# Patient Record
Sex: FEMALE | Employment: UNEMPLOYED
[De-identification: names, ages, dates, MRNs, and addresses within clinical notes are randomized per-mention and may not be internally consistent; named-entity substitution may affect disease eponyms.]

---

## 2017-06-29 ENCOUNTER — DIAGNOSTIC TRANS (OUTPATIENT)
Dept: OTHER | Age: 52
End: 2017-06-29

## 2017-06-29 ENCOUNTER — HOSPITAL (OUTPATIENT)
Dept: OTHER | Age: 52
End: 2017-06-29
Attending: INTERNAL MEDICINE

## 2017-06-29 LAB
ANALYZER ANC (IANC): ABNORMAL
ANION GAP SERPL CALC-SCNC: 10 MMOL/L (ref 10–20)
BASOPHILS # BLD: 0 THOUSAND/MCL (ref 0–0.3)
BASOPHILS NFR BLD: 1 %
BUN SERPL-MCNC: 13 MG/DL (ref 6–20)
BUN/CREAT SERPL: 21 (ref 7–25)
CALCIUM SERPL-MCNC: 8.6 MG/DL (ref 8.4–10.2)
CHLORIDE: 107 MMOL/L (ref 98–107)
CO2 SERPL-SCNC: 29 MMOL/L (ref 21–32)
CREAT SERPL-MCNC: 0.63 MG/DL (ref 0.51–0.95)
DIFFERENTIAL METHOD BLD: ABNORMAL
EOSINOPHIL # BLD: 0.1 THOUSAND/MCL (ref 0.1–0.5)
EOSINOPHIL NFR BLD: 2 %
ERYTHROCYTE [DISTWIDTH] IN BLOOD: 14.9 % (ref 11–15)
GLUCOSE SERPL-MCNC: 122 MG/DL (ref 65–99)
HEMATOCRIT: 35.4 % (ref 36–46.5)
HGB BLD-MCNC: 11.1 GM/DL (ref 12–15.5)
INR PPP: 0.9
LYMPHOCYTES # BLD: 2 THOUSAND/MCL (ref 1–4)
LYMPHOCYTES NFR BLD: 37 %
MCH RBC QN AUTO: 22.6 PG (ref 26–34)
MCHC RBC AUTO-ENTMCNC: 31.4 GM/DL (ref 32–36.5)
MCV RBC AUTO: 72.1 FL (ref 78–100)
MONOCYTES # BLD: 0.4 THOUSAND/MCL (ref 0.3–0.9)
MONOCYTES NFR BLD: 8 %
NEUTROPHILS # BLD: 2.8 THOUSAND/MCL (ref 1.8–7.7)
NEUTROPHILS NFR BLD: 52 %
NEUTS SEG NFR BLD: ABNORMAL %
PERCENT NRBC: ABNORMAL
PLATELET # BLD: 280 THOUSAND/MCL (ref 140–450)
POTASSIUM SERPL-SCNC: 3.9 MMOL/L (ref 3.4–5.1)
PROTHROMBIN TIME: 10 SECONDS (ref 9.7–11.8)
PROTHROMBIN TIME: NORMAL
RBC # BLD: 4.91 MILLION/MCL (ref 4–5.2)
SODIUM SERPL-SCNC: 142 MMOL/L (ref 135–145)
TROPONIN I SERPL HS-MCNC: <0.02 NG/ML
TROPONIN I SERPL HS-MCNC: <0.02 NG/ML
WBC # BLD: 5.4 THOUSAND/MCL (ref 4.2–11)

## 2017-06-30 LAB
ALBUMIN SERPL-MCNC: 2.3 GM/DL (ref 3.6–5.1)
ALBUMIN/GLOB SERPL: 0.7 {RATIO} (ref 1–2.4)
ALP SERPL-CCNC: 83 UNIT/L (ref 45–117)
ALT SERPL-CCNC: 18 UNIT/L
ANALYZER ANC (IANC): ABNORMAL
ANION GAP SERPL CALC-SCNC: 12 MMOL/L (ref 10–20)
AST SERPL-CCNC: 17 UNIT/L
BASOPHILS # BLD: 0 THOUSAND/MCL (ref 0–0.3)
BASOPHILS NFR BLD: 0 %
BILIRUB SERPL-MCNC: 0.3 MG/DL (ref 0.2–1)
BUN SERPL-MCNC: 13 MG/DL (ref 6–20)
BUN/CREAT SERPL: 21 (ref 7–25)
CALCIUM SERPL-MCNC: 8.4 MG/DL (ref 8.4–10.2)
CHLORIDE: 107 MMOL/L (ref 98–107)
CO2 SERPL-SCNC: 27 MMOL/L (ref 21–32)
CREAT SERPL-MCNC: 0.62 MG/DL (ref 0.51–0.95)
DIFFERENTIAL METHOD BLD: ABNORMAL
EOSINOPHIL # BLD: 0.2 THOUSAND/MCL (ref 0.1–0.5)
EOSINOPHIL NFR BLD: 4 %
ERYTHROCYTE [DISTWIDTH] IN BLOOD: 14.9 % (ref 11–15)
GLOBULIN SER-MCNC: 3.5 GM/DL (ref 2–4)
GLUCOSE SERPL-MCNC: 109 MG/DL (ref 65–99)
HEMATOCRIT: 34.8 % (ref 36–46.5)
HGB BLD-MCNC: 10.8 GM/DL (ref 12–15.5)
LYMPHOCYTES # BLD: 2.1 THOUSAND/MCL (ref 1–4)
LYMPHOCYTES NFR BLD: 41 %
MCH RBC QN AUTO: 22.5 PG (ref 26–34)
MCHC RBC AUTO-ENTMCNC: 31 GM/DL (ref 32–36.5)
MCV RBC AUTO: 72.3 FL (ref 78–100)
MONOCYTES # BLD: 0.5 THOUSAND/MCL (ref 0.3–0.9)
MONOCYTES NFR BLD: 9 %
NEUTROPHILS # BLD: 2.3 THOUSAND/MCL (ref 1.8–7.7)
NEUTROPHILS NFR BLD: 46 %
NEUTS SEG NFR BLD: ABNORMAL %
PERCENT NRBC: ABNORMAL
PLATELET # BLD: 266 THOUSAND/MCL (ref 140–450)
POTASSIUM SERPL-SCNC: 4 MMOL/L (ref 3.4–5.1)
PROT SERPL-MCNC: 5.8 GM/DL (ref 6.4–8.2)
RBC # BLD: 4.81 MILLION/MCL (ref 4–5.2)
SODIUM SERPL-SCNC: 142 MMOL/L (ref 135–145)
WBC # BLD: 5.1 THOUSAND/MCL (ref 4.2–11)

## 2017-10-30 ENCOUNTER — DIAGNOSTIC TRANS (OUTPATIENT)
Dept: OTHER | Age: 52
End: 2017-10-30

## 2017-10-30 ENCOUNTER — HOSPITAL (OUTPATIENT)
Dept: OTHER | Age: 52
End: 2017-10-30
Attending: EMERGENCY MEDICINE

## 2017-10-30 LAB
ANALYZER ANC (IANC): ABNORMAL
ANION GAP SERPL CALC-SCNC: 12 MMOL/L (ref 10–20)
BASOPHILS # BLD: 0 THOUSAND/MCL (ref 0–0.3)
BASOPHILS NFR BLD: 0 %
BUN SERPL-MCNC: 14 MG/DL (ref 6–20)
BUN/CREAT SERPL: 20 (ref 7–25)
CALCIUM SERPL-MCNC: 8.9 MG/DL (ref 8.4–10.2)
CHLORIDE: 105 MMOL/L (ref 98–107)
CO2 SERPL-SCNC: 28 MMOL/L (ref 21–32)
CREAT SERPL-MCNC: 0.71 MG/DL (ref 0.51–0.95)
D DIMER PPP FEU-MCNC: 0.37 MG/L FEU
DIFFERENTIAL METHOD BLD: ABNORMAL
EOSINOPHIL # BLD: 0.2 THOUSAND/MCL (ref 0.1–0.5)
EOSINOPHIL NFR BLD: 3 %
ERYTHROCYTE [DISTWIDTH] IN BLOOD: 15.2 % (ref 11–15)
GLUCOSE SERPL-MCNC: 119 MG/DL (ref 65–99)
HEMATOCRIT: 37.2 % (ref 36–46.5)
HGB BLD-MCNC: 11.7 GM/DL (ref 12–15.5)
LYMPHOCYTES # BLD: 2.7 THOUSAND/MCL (ref 1–4)
LYMPHOCYTES NFR BLD: 39 %
MCH RBC QN AUTO: 22.5 PG (ref 26–34)
MCHC RBC AUTO-ENTMCNC: 31.5 GM/DL (ref 32–36.5)
MCV RBC AUTO: 71.7 FL (ref 78–100)
MONOCYTES # BLD: 0.5 THOUSAND/MCL (ref 0.3–0.9)
MONOCYTES NFR BLD: 8 %
NEUTROPHILS # BLD: 3.4 THOUSAND/MCL (ref 1.8–7.7)
NEUTROPHILS NFR BLD: 50 %
NEUTS SEG NFR BLD: ABNORMAL %
PERCENT NRBC: ABNORMAL
PLATELET # BLD: 310 THOUSAND/MCL (ref 140–450)
POTASSIUM SERPL-SCNC: 3.8 MMOL/L (ref 3.4–5.1)
RBC # BLD: 5.19 MILLION/MCL (ref 4–5.2)
SODIUM SERPL-SCNC: 141 MMOL/L (ref 135–145)
WBC # BLD: 6.8 THOUSAND/MCL (ref 4.2–11)

## 2019-01-11 ENCOUNTER — DIAGNOSTIC TRANS (OUTPATIENT)
Dept: OTHER | Age: 54
End: 2019-01-11

## 2019-01-11 LAB
2009 H1N1 SUBTYPE (RF1N1): NOT DETECTED
ADENOVIRUS (RADENO): NOT DETECTED
AMORPH SED URNS QL MICRO: ABNORMAL
ANALYZER ANC (IANC): ABNORMAL
ANION GAP SERPL CALC-SCNC: 9 MMOL/L (ref 10–20)
APPEARANCE UR: CLEAR
BACTERIA #/AREA URNS HPF: ABNORMAL /HPF
BASOPHILS # BLD: 0 THOUSAND/MCL (ref 0–0.3)
BASOPHILS NFR BLD: 0 %
BILIRUB UR QL: NEGATIVE
BOCAVIRUS (RBOCA): NOT DETECTED
BUN SERPL-MCNC: 16 MG/DL (ref 6–20)
BUN/CREAT SERPL: 20 (ref 7–25)
C. PNEUMONIAE (RCHLP): NOT DETECTED
CALCIUM SERPL-MCNC: 8.6 MG/DL (ref 8.4–10.2)
CAOX CRY URNS QL MICRO: ABNORMAL
CHLORIDE: 108 MMOL/L (ref 98–107)
CO2 SERPL-SCNC: 25 MMOL/L (ref 21–32)
COLOR UR: YELLOW
CORONAVIRUS 229E (RC229E): NOT DETECTED
CORONAVIRUS HKU1 (RCHKU1): NOT DETECTED
CORONAVIRUS NL63 (RCNL63): NOT DETECTED
CORONAVIRUS OC43 (RCO43): NOT DETECTED
CREAT SERPL-MCNC: 0.8 MG/DL (ref 0.51–0.95)
DIFFERENTIAL METHOD BLD: ABNORMAL
EOSINOPHIL # BLD: 0.1 THOUSAND/MCL (ref 0.1–0.5)
EOSINOPHIL NFR BLD: 2 %
EPITH CASTS #/AREA URNS LPF: ABNORMAL /[LPF]
ERYTHROCYTE [DISTWIDTH] IN BLOOD: 14.7 % (ref 11–15)
FATTY CASTS #/AREA URNS LPF: ABNORMAL /[LPF]
GLUCOSE SERPL-MCNC: 161 MG/DL (ref 65–99)
GLUCOSE UR-MCNC: NEGATIVE MG/DL
GRAN CASTS #/AREA URNS LPF: ABNORMAL /[LPF]
HEMATOCRIT: 38.2 % (ref 36–46.5)
HGB BLD-MCNC: 11.4 GM/DL (ref 12–15.5)
HGB UR QL: ABNORMAL
HYALINE CASTS #/AREA URNS LPF: ABNORMAL /LPF (ref 0–5)
IMM GRANULOCYTES # BLD AUTO: 0.1 THOUSAND/MCL (ref 0–0.2)
IMM GRANULOCYTES NFR BLD: 1 %
INFLUENZA A SUBTYPE H1 (RFLH1): NOT DETECTED
INFLUENZA A SUBTYPE H3 (RFLH3): NOT DETECTED
INFLUENZA A UNSUBTYPABLE (RIAU): ABNORMAL
INFLUENZA B VIRUS (XFLUB): NOT DETECTED
KETONES UR-MCNC: NEGATIVE MG/DL
LACTATE BLDV-SCNC: 1.9 MMOL/L (ref 0–2)
LEUKOCYTE ESTERASE UR QL STRIP: NEGATIVE
LYMPHOCYTES # BLD: 1.2 THOUSAND/MCL (ref 1–4)
LYMPHOCYTES NFR BLD: 16 %
M. PNEUMONIAE (RMYPP): NOT DETECTED
MCH RBC QN AUTO: 22.2 PG (ref 26–34)
MCHC RBC AUTO-ENTMCNC: 29.8 GM/DL (ref 32–36.5)
MCV RBC AUTO: 74.3 FL (ref 78–100)
METAPNEUMOVIRUS (RMETA): NOT DETECTED
MICROSCOPIC (MT): ABNORMAL
MIXED CELL CASTS #/AREA URNS LPF: ABNORMAL /[LPF]
MONOCYTES # BLD: 0.5 THOUSAND/MCL (ref 0.3–0.9)
MONOCYTES NFR BLD: 7 %
MUCOUS THREADS URNS QL MICRO: PRESENT
NEUTROPHILS # BLD: 5.7 THOUSAND/MCL (ref 1.8–7.7)
NEUTROPHILS NFR BLD: 74 %
NEUTS SEG NFR BLD: ABNORMAL %
NITRITE UR QL: NEGATIVE
NRBC (NRBCRE): 0 /100 WBC
PARAINFLUENZA, TYPE 1 (RPAR1): NOT DETECTED
PARAINFLUENZA, TYPE 2 (RPAR2): NOT DETECTED
PARAINFLUENZA, TYPE 3 (RPAR3): NOT DETECTED
PARAINFLUENZA, TYPE 4 (RPAR4): NOT DETECTED
PH UR: 5 UNIT (ref 5–7)
PLATELET # BLD: 297 THOUSAND/MCL (ref 140–450)
POTASSIUM SERPL-SCNC: 3.8 MMOL/L (ref 3.4–5.1)
PROT UR QL: 100 MG/DL
RBC # BLD: 5.14 MILLION/MCL (ref 4–5.2)
RBC #/AREA URNS HPF: ABNORMAL /HPF (ref 0–3)
RBC CASTS #/AREA URNS LPF: ABNORMAL /[LPF]
RENAL EPI CELLS #/AREA URNS HPF: ABNORMAL /[HPF]
RHINOVIRUS/ENTEROVIRUS (RRHINO): NOT DETECTED
RSV, SUBTYPE A (RRSVA): POSITIVE
RSV, SUBTYPE B (RRSVB): NOT DETECTED
SODIUM SERPL-SCNC: 138 MMOL/L (ref 135–145)
SP GR UR: 1.01 (ref 1–1.03)
SPECIMEN SOURCE: ABNORMAL
SPECIMEN SOURCE: ABNORMAL
SPERM URNS QL MICRO: ABNORMAL
SQUAMOUS #/AREA URNS HPF: ABNORMAL /HPF (ref 0–5)
T VAGINALIS URNS QL MICRO: ABNORMAL
TRI-PHOS CRY URNS QL MICRO: ABNORMAL
TROPONIN I SERPL HS-MCNC: <0.02 NG/ML
URATE CRY URNS QL MICRO: ABNORMAL
URNS CMNT MICRO: ABNORMAL
UROBILINOGEN UR QL: 0.2 MG/DL (ref 0–1)
WAXY CASTS #/AREA URNS LPF: ABNORMAL /[LPF]
WBC # BLD: 7.7 THOUSAND/MCL (ref 4.2–11)
WBC #/AREA URNS HPF: ABNORMAL /HPF (ref 0–5)
WBC CASTS #/AREA URNS LPF: ABNORMAL /[LPF]
YEAST HYPHAE URNS QL MICRO: ABNORMAL
YEAST URNS QL MICRO: ABNORMAL

## 2019-01-12 ENCOUNTER — HOSPITAL (OUTPATIENT)
Dept: OTHER | Age: 54
End: 2019-01-12
Attending: INTERNAL MEDICINE

## 2019-01-12 ENCOUNTER — HOSPITAL (OUTPATIENT)
Dept: OTHER | Age: 54
End: 2019-01-12

## 2019-01-12 LAB
D DIMER PPP FEU-MCNC: 0.54 MG/L FEU
PROCALCITONIN SERPL IA-MCNC: <0.05 NG/ML

## 2019-01-13 LAB
ANALYZER ANC (IANC): ABNORMAL
ANION GAP SERPL CALC-SCNC: 12 MMOL/L (ref 10–20)
BASOPHILS # BLD: 0 THOUSAND/MCL (ref 0–0.3)
BASOPHILS NFR BLD: 0 %
BUN SERPL-MCNC: 20 MG/DL (ref 6–20)
BUN/CREAT SERPL: 23 (ref 7–25)
CALCIUM SERPL-MCNC: 8.8 MG/DL (ref 8.4–10.2)
CHLORIDE: 111 MMOL/L (ref 98–107)
CO2 SERPL-SCNC: 26 MMOL/L (ref 21–32)
CREAT SERPL-MCNC: 0.86 MG/DL (ref 0.51–0.95)
DIFFERENTIAL METHOD BLD: ABNORMAL
EOSINOPHIL # BLD: 0 THOUSAND/MCL (ref 0.1–0.5)
EOSINOPHIL NFR BLD: 1 %
ERYTHROCYTE [DISTWIDTH] IN BLOOD: 15.2 % (ref 11–15)
GLUCOSE SERPL-MCNC: 135 MG/DL (ref 65–99)
HEMATOCRIT: 36.4 % (ref 36–46.5)
HGB BLD-MCNC: 10.7 GM/DL (ref 12–15.5)
IMM GRANULOCYTES # BLD AUTO: 0.2 THOUSAND/MCL (ref 0–0.2)
IMM GRANULOCYTES NFR BLD: 3 %
LYMPHOCYTES # BLD: 1.5 THOUSAND/MCL (ref 1–4)
LYMPHOCYTES NFR BLD: 23 %
MCH RBC QN AUTO: 22 PG (ref 26–34)
MCHC RBC AUTO-ENTMCNC: 29.4 GM/DL (ref 32–36.5)
MCV RBC AUTO: 74.9 FL (ref 78–100)
MONOCYTES # BLD: 0.8 THOUSAND/MCL (ref 0.3–0.9)
MONOCYTES NFR BLD: 11 %
NEUTROPHILS # BLD: 4.2 THOUSAND/MCL (ref 1.8–7.7)
NEUTROPHILS NFR BLD: 62 %
NEUTS SEG NFR BLD: ABNORMAL %
NRBC (NRBCRE): 0 /100 WBC
PLATELET # BLD: 312 THOUSAND/MCL (ref 140–450)
POTASSIUM SERPL-SCNC: 4 MMOL/L (ref 3.4–5.1)
RBC # BLD: 4.86 MILLION/MCL (ref 4–5.2)
SODIUM SERPL-SCNC: 145 MMOL/L (ref 135–145)
WBC # BLD: 6.7 THOUSAND/MCL (ref 4.2–11)

## 2019-07-03 ENCOUNTER — HOSPITAL (OUTPATIENT)
Dept: OTHER | Age: 54
End: 2019-07-03
Attending: INTERNAL MEDICINE

## 2019-08-16 ENCOUNTER — APPOINTMENT (OUTPATIENT)
Dept: GENERAL RADIOLOGY | Facility: HOSPITAL | Age: 54
End: 2019-08-16
Attending: EMERGENCY MEDICINE
Payer: MEDICAID

## 2019-08-16 ENCOUNTER — HOSPITAL ENCOUNTER (EMERGENCY)
Facility: HOSPITAL | Age: 54
Discharge: HOME | End: 2019-08-16
Attending: EMERGENCY MEDICINE
Payer: MEDICAID

## 2019-08-16 VITALS
BODY MASS INDEX: 46.38 KG/M2 | HEIGHT: 62 IN | RESPIRATION RATE: 14 BRPM | WEIGHT: 252 LBS | DIASTOLIC BLOOD PRESSURE: 108 MMHG | OXYGEN SATURATION: 94 % | TEMPERATURE: 98 F | HEART RATE: 88 BPM | SYSTOLIC BLOOD PRESSURE: 142 MMHG

## 2019-08-16 DIAGNOSIS — S62.524A CLOSED NONDISPLACED FRACTURE OF DISTAL PHALANX OF RIGHT THUMB, INITIAL ENCOUNTER: Primary | ICD-10-CM

## 2019-08-16 DIAGNOSIS — S39.012A STRAIN OF LUMBAR REGION, INITIAL ENCOUNTER: ICD-10-CM
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
## 2019-08-16 DIAGNOSIS — S83.92XA SPRAIN OF LEFT KNEE, UNSPECIFIED LIGAMENT, INITIAL ENCOUNTER: ICD-10-CM

## 2019-08-16 PROCEDURE — 73140 X-RAY EXAM OF FINGER(S): CPT | Performed by: EMERGENCY MEDICINE

## 2019-08-16 PROCEDURE — 73562 X-RAY EXAM OF KNEE 3: CPT | Performed by: EMERGENCY MEDICINE

## 2019-08-16 PROCEDURE — 99284 EMERGENCY DEPT VISIT MOD MDM: CPT

## 2019-08-16 PROCEDURE — 72100 X-RAY EXAM L-S SPINE 2/3 VWS: CPT | Performed by: EMERGENCY MEDICINE

## 2019-08-16 RX ORDER — TRAMADOL HYDROCHLORIDE 50 MG/1
50 TABLET ORAL EVERY 6 HOURS PRN
Qty: 10 TABLET | Refills: 0 | Status: SHIPPED | OUTPATIENT
Start: 2019-08-16 | End: 2019-08-23

## 2019-08-16 RX ORDER — LISINOPRIL 10 MG/1
10 TABLET ORAL DAILY
COMMUNITY

## 2019-08-16 RX ORDER — ALLOPURINOL 100 MG/1
100 TABLET ORAL DAILY
COMMUNITY

## 2019-08-16 RX ORDER — TRAMADOL HYDROCHLORIDE 50 MG/1
50 TABLET ORAL ONCE
Status: COMPLETED | OUTPATIENT
Start: 2019-08-16 | End: 2019-08-16

## 2019-08-16 NOTE — ED NOTES
Pt provided with discharge instructions. Verbalized understanding for plan of care at home and follow up. All questions/concerns addressed prior to discharge.    Pt wheeled out of er with family member

## 2019-08-16 NOTE — ED NOTES
Pt c/o pain in multiple areas after she tripped up a stair tonight. Pt c/o pain to bl knees, rt 1st digit, and low back. Pt denies hitting her head, LOC, blood thinners. CMS is intact to ble. Swelling & bruising noted to rt 1st digit.  Will continue to [facility]

## 2019-08-16 NOTE — ED PROVIDER NOTES
Patient Seen in: [facility] Emergency Department    History   Patient presents with:  Trauma (cardiovascular, musculoskeletal)    Stated Complaint: missed a step and fell    HPI    History is provided by patient and patient's son.     67-year-old f negative. Positive for stated complaint: missed a step and fell  Other systems are as noted in HPI. Constitutional and vital signs reviewed. All other systems reviewed and negative except as noted above.     Physical Exam     ED Triage Vitals [08 normal sensation in all extremities, normal finger to nose b/l, normal gait, no facial asymmetry, normal speech     Skin: Skin is warm and dry. No rash noted. She is not diaphoretic. Psychiatric: She has a normal mood and affect.    Nursing note and vital and verified by the Radiologist whose name is printed above. DD:  08/16/2019/DT:  08/16/2019    EMERGENCY DEPARTMENT COURSE AND TREATMENT:  Patient's condition was stable during Emergency Department evaluation.      53yoF with multiple complaints after f region, initial encounter  Fall, initial encounter    Disposition:  Discharge  8/16/2019  3:27 am    Follow-up:  Vic Ornelas, [address]  [phone]    Schedule an appointment as soon as possible for a visit in [address]

## 2019-08-16 NOTE — ED INITIAL ASSESSMENT (HPI)
Mechanical fall at 5pm walking into house from garage, missed a step and fell on her left side. Denies hitting head, or LOC. +nausea and dizziness after fall. C/o bilateral knee pain, limited ROM d/t.  Also c/o right thumb pain-bruising and swelling present

## 2021-10-14 ENCOUNTER — HOSPITAL ENCOUNTER (EMERGENCY)
Age: 56
Discharge: HOME | End: 2021-10-14
Attending: EMERGENCY MEDICINE

## 2021-10-14 ENCOUNTER — APPOINTMENT (OUTPATIENT)
Dept: GENERAL RADIOLOGY | Age: 56
End: 2021-10-14
Attending: EMERGENCY MEDICINE

## 2021-10-14 VITALS
RESPIRATION RATE: 18 BRPM | OXYGEN SATURATION: 98 % | HEART RATE: 112 BPM | TEMPERATURE: 95.9 F | DIASTOLIC BLOOD PRESSURE: 116 MMHG | SYSTOLIC BLOOD PRESSURE: 147 MMHG

## 2021-10-14 DIAGNOSIS — J40 BRONCHITIS: ICD-10-CM

## 2021-10-14 DIAGNOSIS — B34.9 VIRAL SYNDROME: Primary | ICD-10-CM

## 2021-10-14 DIAGNOSIS — R06.02 SHORTNESS OF BREATH: ICD-10-CM

## 2021-10-14 LAB
ALBUMIN SERPL-MCNC: 2.6 G/DL (ref 3.6–5.1)
ALBUMIN/GLOB SERPL: 0.6 {RATIO} (ref 1–2.4)
ALP SERPL-CCNC: 114 UNITS/L (ref 45–117)
ALT SERPL-CCNC: 22 UNITS/L
ANION GAP SERPL CALC-SCNC: 9 MMOL/L (ref 10–20)
AST SERPL-CCNC: 20 UNITS/L
ATRIAL RATE (BPM): 104
BASOPHILS # BLD: 0 K/MCL (ref 0–0.3)
BASOPHILS NFR BLD: 1 %
BILIRUB SERPL-MCNC: 0.3 MG/DL (ref 0.2–1)
BUN SERPL-MCNC: 16 MG/DL (ref 6–20)
BUN/CREAT SERPL: 21 (ref 7–25)
CALCIUM SERPL-MCNC: 8.8 MG/DL (ref 8.4–10.2)
CHLORIDE SERPL-SCNC: 106 MMOL/L (ref 98–107)
CO2 SERPL-SCNC: 25 MMOL/L (ref 21–32)
CREAT SERPL-MCNC: 0.76 MG/DL (ref 0.51–0.95)
D DIMER PPP FEU-MCNC: 0.51 MG/L (FEU)
DEPRECATED RDW RBC: 42.8 FL (ref 39–50)
EOSINOPHIL # BLD: 0.3 K/MCL (ref 0–0.5)
EOSINOPHIL NFR BLD: 4 %
ERYTHROCYTE [DISTWIDTH] IN BLOOD: 16.2 % (ref 11–15)
FASTING DURATION TIME PATIENT: ABNORMAL H
GFR SERPLBLD BASED ON 1.73 SQ M-ARVRAT: 87 ML/MIN
GLOBULIN SER-MCNC: 4.6 G/DL (ref 2–4)
GLUCOSE SERPL-MCNC: 145 MG/DL (ref 70–99)
HCT VFR BLD CALC: 38.5 % (ref 36–46.5)
HGB BLD-MCNC: 11.5 G/DL (ref 12–15.5)
IMM GRANULOCYTES # BLD AUTO: 0.1 K/MCL (ref 0–0.2)
IMM GRANULOCYTES # BLD: 1 %
LYMPHOCYTES # BLD: 0.9 K/MCL (ref 1–4)
LYMPHOCYTES NFR BLD: 11 %
MCH RBC QN AUTO: 22 PG (ref 26–34)
MCHC RBC AUTO-ENTMCNC: 29.9 G/DL (ref 32–36.5)
MCV RBC AUTO: 73.8 FL (ref 78–100)
MONOCYTES # BLD: 0.8 K/MCL (ref 0.3–0.9)
MONOCYTES NFR BLD: 9 %
NEUTROPHILS # BLD: 6.2 K/MCL (ref 1.8–7.7)
NEUTROPHILS NFR BLD: 74 %
NRBC BLD MANUAL-RTO: 0 /100 WBC
P AXIS (DEGREES): 46
PLATELET # BLD AUTO: 286 K/MCL (ref 140–450)
POTASSIUM SERPL-SCNC: 4.3 MMOL/L (ref 3.4–5.1)
PR-INTERVAL (MSEC): 137
PROT SERPL-MCNC: 7.2 G/DL (ref 6.4–8.2)
QRS-INTERVAL (MSEC): 79
QT-INTERVAL (MSEC): 326
QTC: 431
R AXIS (DEGREES): -36
RBC # BLD: 5.22 MIL/MCL (ref 4–5.2)
REPORT TEXT: NORMAL
SARS-COV-2 RNA RESP QL NAA+PROBE: NOT DETECTED
SERVICE CMNT-IMP: NORMAL
SERVICE CMNT-IMP: NORMAL
SODIUM SERPL-SCNC: 136 MMOL/L (ref 135–145)
T AXIS (DEGREES): 29
TROPONIN I SERPL HS-MCNC: <0.02 NG/ML
VENTRICULAR RATE EKG/MIN (BPM): 105
WBC # BLD: 8.3 K/MCL (ref 4.2–11)

## 2021-10-14 PROCEDURE — 93010 ELECTROCARDIOGRAM REPORT: CPT | Performed by: INTERNAL MEDICINE

## 2021-10-14 PROCEDURE — 85379 FIBRIN DEGRADATION QUANT: CPT | Performed by: EMERGENCY MEDICINE

## 2021-10-14 PROCEDURE — 93005 ELECTROCARDIOGRAM TRACING: CPT | Performed by: EMERGENCY MEDICINE

## 2021-10-14 PROCEDURE — 99284 EMERGENCY DEPT VISIT MOD MDM: CPT | Performed by: EMERGENCY MEDICINE

## 2021-10-14 PROCEDURE — 36415 COLL VENOUS BLD VENIPUNCTURE: CPT

## 2021-10-14 PROCEDURE — 84484 ASSAY OF TROPONIN QUANT: CPT | Performed by: EMERGENCY MEDICINE

## 2021-10-14 PROCEDURE — 99285 EMERGENCY DEPT VISIT HI MDM: CPT

## 2021-10-14 PROCEDURE — C9803 HOPD COVID-19 SPEC COLLECT: HCPCS

## 2021-10-14 PROCEDURE — 87635 SARS-COV-2 COVID-19 AMP PRB: CPT | Performed by: EMERGENCY MEDICINE

## 2021-10-14 PROCEDURE — 71046 X-RAY EXAM CHEST 2 VIEWS: CPT

## 2021-10-14 PROCEDURE — 80053 COMPREHEN METABOLIC PANEL: CPT | Performed by: EMERGENCY MEDICINE

## 2021-10-14 PROCEDURE — 85025 COMPLETE CBC W/AUTO DIFF WBC: CPT | Performed by: EMERGENCY MEDICINE

## 2021-10-14 RX ORDER — DOXYCYCLINE HYCLATE 100 MG
100 TABLET ORAL 2 TIMES DAILY
Qty: 14 TABLET | Refills: 0 | Status: SHIPPED | OUTPATIENT
Start: 2021-10-14 | End: 2021-10-21

## 2021-10-14 RX ORDER — PREDNISONE 50 MG/1
50 TABLET ORAL DAILY
Qty: 5 TABLET | Refills: 0 | Status: SHIPPED | OUTPATIENT
Start: 2021-10-14

## 2021-10-14 ASSESSMENT — PAIN SCALES - GENERAL: PAINLEVEL_OUTOF10: 0

## 2021-10-30 ENCOUNTER — HOSPITAL ENCOUNTER (EMERGENCY)
Facility: HOSPITAL | Age: 56
Discharge: HOME | End: 2021-10-30
Attending: EMERGENCY MEDICINE
Payer: MEDICAID

## 2021-10-30 ENCOUNTER — APPOINTMENT (OUTPATIENT)
Dept: GENERAL RADIOLOGY | Facility: HOSPITAL | Age: 56
End: 2021-10-30
Attending: EMERGENCY MEDICINE
Payer: MEDICAID

## 2021-10-30 ENCOUNTER — APPOINTMENT (OUTPATIENT)
Dept: CT IMAGING | Facility: HOSPITAL | Age: 56
End: 2021-10-30
Attending: EMERGENCY MEDICINE
Payer: MEDICAID

## 2021-10-30 VITALS
DIASTOLIC BLOOD PRESSURE: 108 MMHG | HEIGHT: 62 IN | OXYGEN SATURATION: 98 % | TEMPERATURE: 99 F | SYSTOLIC BLOOD PRESSURE: 161 MMHG | HEART RATE: 102 BPM | BODY MASS INDEX: 49.69 KG/M2 | WEIGHT: 270 LBS | RESPIRATION RATE: 22 BRPM

## 2021-10-30 DIAGNOSIS — R19.7 NAUSEA VOMITING AND DIARRHEA: ICD-10-CM

## 2021-10-30 DIAGNOSIS — R11.2 NAUSEA VOMITING AND DIARRHEA: ICD-10-CM

## 2021-10-30 DIAGNOSIS — N30.00 ACUTE CYSTITIS WITHOUT HEMATURIA: Primary | ICD-10-CM

## 2021-10-30 PROCEDURE — 99285 EMERGENCY DEPT VISIT HI MDM: CPT

## 2021-10-30 PROCEDURE — 96372 THER/PROPH/DIAG INJ SC/IM: CPT

## 2021-10-30 PROCEDURE — 83690 ASSAY OF LIPASE: CPT | Performed by: EMERGENCY MEDICINE

## 2021-10-30 PROCEDURE — 82550 ASSAY OF CK (CPK): CPT | Performed by: EMERGENCY MEDICINE

## 2021-10-30 PROCEDURE — 80076 HEPATIC FUNCTION PANEL: CPT | Performed by: EMERGENCY MEDICINE

## 2021-10-30 PROCEDURE — 93010 ELECTROCARDIOGRAM REPORT: CPT | Performed by: EMERGENCY MEDICINE

## 2021-10-30 PROCEDURE — 85025 COMPLETE CBC W/AUTO DIFF WBC: CPT | Performed by: EMERGENCY MEDICINE

## 2021-10-30 PROCEDURE — 96360 HYDRATION IV INFUSION INIT: CPT

## 2021-10-30 PROCEDURE — 96361 HYDRATE IV INFUSION ADD-ON: CPT

## 2021-10-30 PROCEDURE — 93005 ELECTROCARDIOGRAM TRACING: CPT

## 2021-10-30 PROCEDURE — 74177 CT ABD & PELVIS W/CONTRAST: CPT | Performed by: EMERGENCY MEDICINE

## 2021-10-30 PROCEDURE — 71045 X-RAY EXAM CHEST 1 VIEW: CPT | Performed by: EMERGENCY MEDICINE

## 2021-10-30 PROCEDURE — 87086 URINE CULTURE/COLONY COUNT: CPT | Performed by: EMERGENCY MEDICINE

## 2021-10-30 PROCEDURE — 81001 URINALYSIS AUTO W/SCOPE: CPT | Performed by: EMERGENCY MEDICINE

## 2021-10-30 PROCEDURE — 80048 BASIC METABOLIC PNL TOTAL CA: CPT | Performed by: EMERGENCY MEDICINE

## 2021-10-30 RX ORDER — ONDANSETRON 4 MG/1
4 TABLET, ORALLY DISINTEGRATING ORAL EVERY 4 HOURS PRN
Qty: 10 TABLET | Refills: 0 | Status: SHIPPED | OUTPATIENT
Start: 2021-10-30 | End: 2021-11-06

## 2021-10-30 RX ORDER — DICYCLOMINE HCL 20 MG
20 TABLET ORAL 4 TIMES DAILY PRN
Qty: 30 TABLET | Refills: 0 | Status: SHIPPED | OUTPATIENT
Start: 2021-10-30 | End: 2021-11-29

## 2021-10-30 RX ORDER — SULFAMETHOXAZOLE AND TRIMETHOPRIM 800; 160 MG/1; MG/1
1 TABLET ORAL 2 TIMES DAILY
Qty: 14 TABLET | Refills: 0 | Status: SHIPPED | OUTPATIENT
Start: 2021-10-30 | End: 2021-11-06

## 2021-10-30 RX ORDER — CEPHALEXIN 250 MG/5ML
POWDER, FOR SUSPENSION ORAL 4 TIMES DAILY
COMMUNITY

## 2021-10-30 RX ORDER — DICYCLOMINE HYDROCHLORIDE 10 MG/ML
20 INJECTION INTRAMUSCULAR ONCE
Status: COMPLETED | OUTPATIENT
Start: 2021-10-30 | End: 2021-10-30

## 2021-10-30 NOTE — ED INITIAL ASSESSMENT (HPI)
States that she stated to Have Lt sided abd pain that started earlier this day. (+) N/V D.    Has been on medss for UTI

## 2021-10-30 NOTE — ED PROVIDER NOTES
Patient Seen in: [facility] AND [facility] Emergency Department      History   Patient presents with:  Abdomen/Flank Pain    Stated Complaint: Lt abd pain     Subjective:   HPI  23-year-old female with thin basement membrane disease, hypertension, presents for e Cardiovascular:      Rate and Rhythm: Normal rate and regular rhythm. Heart sounds: Normal heart sounds. Pulmonary:      Effort: Pulmonary effort is normal.      Breath sounds: Normal breath sounds.    Abdominal:      General: There is no distensio following components:    Albumin 2.5 (*)     All other components within normal limits   CBC W/ DIFFERENTIAL - Abnormal; Notable for the following components:    HGB 10.6 (*)     MCV 73.4 (*)     MCH 21.7 (*)     MCHC 29.6 (*)     RDW 16.0 (*)     All othe lipase. CPK within normal range. There is no leukocytosis. She does have evidence of urinary tract infection will be treated with antibiotics. Chest x-ray and CT and pelvis without clear etiology to explain her symptoms.   In the ER, she was treated wit

## 2021-11-24 ENCOUNTER — HOSPITAL ENCOUNTER (OUTPATIENT)
Dept: ULTRASOUND IMAGING | Age: 56
Discharge: HOME | End: 2021-11-24
Attending: INTERNAL MEDICINE

## 2021-11-24 ENCOUNTER — HOSPITAL ENCOUNTER (OUTPATIENT)
Dept: MAMMOGRAPHY | Age: 56
Discharge: HOME | End: 2021-11-24
Attending: INTERNAL MEDICINE

## 2021-11-24 DIAGNOSIS — N63.0 BREAST NODULE: ICD-10-CM

## 2021-11-24 PROCEDURE — 76642 ULTRASOUND BREAST LIMITED: CPT

## 2021-11-24 PROCEDURE — G0279 TOMOSYNTHESIS, MAMMO: HCPCS

## 2022-09-21 ENCOUNTER — HOSPITAL ENCOUNTER (EMERGENCY)
Facility: HOSPITAL | Age: 57
Discharge: HOME | End: 2022-09-21

## 2022-09-21 ENCOUNTER — APPOINTMENT (OUTPATIENT)
Dept: GENERAL RADIOLOGY | Facility: HOSPITAL | Age: 57
End: 2022-09-21
Attending: NURSE PRACTITIONER

## 2022-09-21 VITALS
HEIGHT: 62 IN | OXYGEN SATURATION: 96 % | TEMPERATURE: 98 F | RESPIRATION RATE: 20 BRPM | DIASTOLIC BLOOD PRESSURE: 8 MMHG | SYSTOLIC BLOOD PRESSURE: 138 MMHG | BODY MASS INDEX: 46.01 KG/M2 | HEART RATE: 99 BPM | WEIGHT: 250 LBS

## 2022-09-21 DIAGNOSIS — H66.001 NON-RECURRENT ACUTE SUPPURATIVE OTITIS MEDIA OF RIGHT EAR WITHOUT SPONTANEOUS RUPTURE OF TYMPANIC MEMBRANE: Primary | ICD-10-CM

## 2022-09-21 LAB — SARS-COV-2 RNA RESP QL NAA+PROBE: NOT DETECTED

## 2022-09-21 PROCEDURE — 71045 X-RAY EXAM CHEST 1 VIEW: CPT | Performed by: NURSE PRACTITIONER

## 2022-09-21 PROCEDURE — 99284 EMERGENCY DEPT VISIT MOD MDM: CPT

## 2022-09-21 PROCEDURE — 94640 AIRWAY INHALATION TREATMENT: CPT

## 2022-09-21 RX ORDER — ALBUTEROL SULFATE 90 UG/1
2 AEROSOL, METERED RESPIRATORY (INHALATION) EVERY 4 HOURS PRN
Qty: 1 EACH | Refills: 0 | Status: SHIPPED | OUTPATIENT
Start: 2022-09-21 | End: 2022-10-21

## 2022-09-21 RX ORDER — AMOXICILLIN 875 MG/1
875 TABLET, COATED ORAL 2 TIMES DAILY
Qty: 20 TABLET | Refills: 0 | Status: SHIPPED | OUTPATIENT
Start: 2022-09-21 | End: 2022-10-01

## 2022-09-21 RX ORDER — ERYTHROMYCIN 5 MG/G
1 OINTMENT OPHTHALMIC EVERY 6 HOURS
Qty: 1 G | Refills: 0 | Status: SHIPPED | OUTPATIENT
Start: 2022-09-21 | End: 2022-09-28

## 2022-09-21 RX ORDER — IPRATROPIUM BROMIDE AND ALBUTEROL SULFATE 2.5; .5 MG/3ML; MG/3ML
3 SOLUTION RESPIRATORY (INHALATION) ONCE
Status: COMPLETED | OUTPATIENT
Start: 2022-09-21 | End: 2022-09-21

## 2022-09-21 NOTE — ED INITIAL ASSESSMENT (HPI)
Pt reports recent pink eye, sts since then has been feeling sick, c/o pain to throat, ears, asthma flareup.

## 2022-11-04 ENCOUNTER — TELEPHONE (OUTPATIENT)
Dept: ULTRASOUND IMAGING | Age: 57
End: 2022-11-04

## 2024-07-04 ENCOUNTER — HOSPITAL ENCOUNTER (EMERGENCY)
Facility: HOSPITAL | Age: 59
Discharge: HOME | End: 2024-07-04
Attending: EMERGENCY MEDICINE
Payer: MEDICAID

## 2024-07-04 VITALS
SYSTOLIC BLOOD PRESSURE: 113 MMHG | RESPIRATION RATE: 18 BRPM | BODY MASS INDEX: 49.69 KG/M2 | OXYGEN SATURATION: 96 % | DIASTOLIC BLOOD PRESSURE: 68 MMHG | HEIGHT: 62 IN | TEMPERATURE: 98 F | WEIGHT: 270 LBS | HEART RATE: 102 BPM

## 2024-07-04 DIAGNOSIS — E87.20 LACTIC ACIDOSIS: ICD-10-CM

## 2024-07-04 DIAGNOSIS — N39.0 URINARY TRACT INFECTION WITHOUT HEMATURIA, SITE UNSPECIFIED: Primary | ICD-10-CM

## 2024-07-04 LAB
ALBUMIN SERPL-MCNC: 4.1 G/DL (ref 3.2–4.8)
ALBUMIN/GLOB SERPL: 1.5 {RATIO} (ref 1–2)
ALP LIVER SERPL-CCNC: 93 U/L
ALT SERPL-CCNC: 18 U/L
ANION GAP SERPL CALC-SCNC: 8 MMOL/L (ref 0–18)
AST SERPL-CCNC: 20 U/L (ref ?–34)
BASOPHILS # BLD AUTO: 0.03 X10(3) UL (ref 0–0.2)
BASOPHILS NFR BLD AUTO: 0.4 %
BILIRUB SERPL-MCNC: 0.4 MG/DL (ref 0.2–1.1)
BILIRUB UR QL CFM: NEGATIVE
BUN BLD-MCNC: 15 MG/DL (ref 9–23)
BUN/CREAT SERPL: 14.2 (ref 10–20)
CALCIUM BLD-MCNC: 9.3 MG/DL (ref 8.7–10.4)
CHLORIDE SERPL-SCNC: 108 MMOL/L (ref 98–112)
CO2 SERPL-SCNC: 27 MMOL/L (ref 21–32)
CREAT BLD-MCNC: 1.06 MG/DL
DEPRECATED RDW RBC AUTO: 41.7 FL (ref 35.1–46.3)
EGFRCR SERPLBLD CKD-EPI 2021: 60 ML/MIN/1.73M2 (ref 60–?)
EOSINOPHIL # BLD AUTO: 0.09 X10(3) UL (ref 0–0.7)
EOSINOPHIL NFR BLD AUTO: 1.2 %
ERYTHROCYTE [DISTWIDTH] IN BLOOD BY AUTOMATED COUNT: 15.2 % (ref 11–15)
GLOBULIN PLAS-MCNC: 2.8 G/DL (ref 2–3.5)
GLUCOSE BLD-MCNC: 147 MG/DL (ref 70–99)
GLUCOSE UR-MCNC: 30 MG/DL
HCT VFR BLD AUTO: 40.4 %
HGB BLD-MCNC: 12.3 G/DL
IMM GRANULOCYTES # BLD AUTO: 0.03 X10(3) UL (ref 0–1)
IMM GRANULOCYTES NFR BLD: 0.4 %
KETONES UR-MCNC: NEGATIVE MG/DL
LACTATE SERPL-SCNC: 1.9 MMOL/L (ref 0.5–2)
LACTATE SERPL-SCNC: 2.7 MMOL/L (ref 0.5–2)
LEUKOCYTE ESTERASE UR QL STRIP.AUTO: 500
LYMPHOCYTES # BLD AUTO: 1.06 X10(3) UL (ref 1–4)
LYMPHOCYTES NFR BLD AUTO: 13.9 %
MCH RBC QN AUTO: 23.5 PG (ref 26–34)
MCHC RBC AUTO-ENTMCNC: 30.4 G/DL (ref 31–37)
MCV RBC AUTO: 77.1 FL
MONOCYTES # BLD AUTO: 0.12 X10(3) UL (ref 0.1–1)
MONOCYTES NFR BLD AUTO: 1.6 %
NEUTROPHILS # BLD AUTO: 6.29 X10 (3) UL (ref 1.5–7.7)
NEUTROPHILS # BLD AUTO: 6.29 X10(3) UL (ref 1.5–7.7)
NEUTROPHILS NFR BLD AUTO: 82.5 %
OSMOLALITY SERPL CALC.SUM OF ELEC: 300 MOSM/KG (ref 275–295)
PH UR: 6 [PH] (ref 5–8)
PLATELET # BLD AUTO: 304 10(3)UL (ref 150–450)
POTASSIUM SERPL-SCNC: 3.9 MMOL/L (ref 3.5–5.1)
PROT SERPL-MCNC: 6.9 G/DL (ref 5.7–8.2)
PROT UR-MCNC: 300 MG/DL
RBC # BLD AUTO: 5.24 X10(6)UL
RBC #/AREA URNS AUTO: >10 /HPF
SODIUM SERPL-SCNC: 143 MMOL/L (ref 136–145)
SP GR UR STRIP: 1.02 (ref 1–1.03)
UROBILINOGEN UR STRIP-ACNC: 2
WBC # BLD AUTO: 7.6 X10(3) UL (ref 4–11)
WBC #/AREA URNS AUTO: >50 /HPF
WBC CLUMPS UR QL AUTO: PRESENT /HPF

## 2024-07-04 PROCEDURE — 99284 EMERGENCY DEPT VISIT MOD MDM: CPT

## 2024-07-04 PROCEDURE — 83605 ASSAY OF LACTIC ACID: CPT | Performed by: EMERGENCY MEDICINE

## 2024-07-04 PROCEDURE — 96365 THER/PROPH/DIAG IV INF INIT: CPT

## 2024-07-04 PROCEDURE — 87186 SC STD MICRODIL/AGAR DIL: CPT | Performed by: EMERGENCY MEDICINE

## 2024-07-04 PROCEDURE — 87088 URINE BACTERIA CULTURE: CPT | Performed by: EMERGENCY MEDICINE

## 2024-07-04 PROCEDURE — 87040 BLOOD CULTURE FOR BACTERIA: CPT | Performed by: EMERGENCY MEDICINE

## 2024-07-04 PROCEDURE — 87150 DNA/RNA AMPLIFIED PROBE: CPT | Performed by: EMERGENCY MEDICINE

## 2024-07-04 PROCEDURE — 85025 COMPLETE CBC W/AUTO DIFF WBC: CPT | Performed by: EMERGENCY MEDICINE

## 2024-07-04 PROCEDURE — 36415 COLL VENOUS BLD VENIPUNCTURE: CPT

## 2024-07-04 PROCEDURE — 80053 COMPREHEN METABOLIC PANEL: CPT | Performed by: EMERGENCY MEDICINE

## 2024-07-04 PROCEDURE — 81001 URINALYSIS AUTO W/SCOPE: CPT | Performed by: EMERGENCY MEDICINE

## 2024-07-04 PROCEDURE — 96375 TX/PRO/DX INJ NEW DRUG ADDON: CPT

## 2024-07-04 PROCEDURE — 87086 URINE CULTURE/COLONY COUNT: CPT | Performed by: EMERGENCY MEDICINE

## 2024-07-04 PROCEDURE — 96361 HYDRATE IV INFUSION ADD-ON: CPT

## 2024-07-04 PROCEDURE — 87205 SMEAR GRAM STAIN: CPT | Performed by: EMERGENCY MEDICINE

## 2024-07-04 RX ORDER — CEPHALEXIN 500 MG/1
500 CAPSULE ORAL 2 TIMES DAILY
Qty: 20 CAPSULE | Refills: 0 | Status: ON HOLD | OUTPATIENT
Start: 2024-07-04 | End: 2024-07-08

## 2024-07-04 RX ORDER — ACETAMINOPHEN 500 MG
1000 TABLET ORAL ONCE
Status: COMPLETED | OUTPATIENT
Start: 2024-07-04 | End: 2024-07-04

## 2024-07-04 RX ORDER — KETOROLAC TROMETHAMINE 15 MG/ML
15 INJECTION, SOLUTION INTRAMUSCULAR; INTRAVENOUS ONCE
Status: COMPLETED | OUTPATIENT
Start: 2024-07-04 | End: 2024-07-04

## 2024-07-04 NOTE — ED INITIAL ASSESSMENT (HPI)
Patient to ED from home for worsening UTI s/s. Pt was diagnosed with UTI at her PCP 3 days ago. Patient was unable to  antibiotics and is now experiencing tachycardia, chills, and dysuria. A/Ox4 +BL flank pain, nausea.

## 2024-07-05 ENCOUNTER — APPOINTMENT (OUTPATIENT)
Dept: CT IMAGING | Facility: HOSPITAL | Age: 59
End: 2024-07-05
Attending: EMERGENCY MEDICINE
Payer: MEDICAID

## 2024-07-05 ENCOUNTER — HOSPITAL ENCOUNTER (INPATIENT)
Facility: HOSPITAL | Age: 59
LOS: 3 days | Discharge: HOME | End: 2024-07-08
Attending: EMERGENCY MEDICINE | Admitting: HOSPITALIST
Payer: MEDICAID

## 2024-07-05 DIAGNOSIS — N30.00 ACUTE CYSTITIS WITHOUT HEMATURIA: Primary | ICD-10-CM

## 2024-07-05 PROBLEM — R78.81 BACTEREMIA: Status: ACTIVE | Noted: 2024-07-05

## 2024-07-05 LAB
ANION GAP SERPL CALC-SCNC: 6 MMOL/L (ref 0–18)
BASOPHILS # BLD AUTO: 0.04 X10(3) UL (ref 0–0.2)
BASOPHILS NFR BLD AUTO: 0.6 %
BLACTX-M ISLT/SPM QL: NOT DETECTED
BUN BLD-MCNC: 14 MG/DL (ref 9–23)
BUN/CREAT SERPL: 16.7 (ref 10–20)
CALCIUM BLD-MCNC: 9 MG/DL (ref 8.7–10.4)
CHLORIDE SERPL-SCNC: 111 MMOL/L (ref 98–112)
CO2 SERPL-SCNC: 25 MMOL/L (ref 21–32)
CREAT BLD-MCNC: 0.84 MG/DL
DEPRECATED RDW RBC AUTO: 41.1 FL (ref 35.1–46.3)
E COLI DNA BLD POS QL NAA+NON-PROBE: DETECTED
EGFRCR SERPLBLD CKD-EPI 2021: 80 ML/MIN/1.73M2 (ref 60–?)
EOSINOPHIL # BLD AUTO: 0.15 X10(3) UL (ref 0–0.7)
EOSINOPHIL NFR BLD AUTO: 2.4 %
ERYTHROCYTE [DISTWIDTH] IN BLOOD BY AUTOMATED COUNT: 15.4 % (ref 11–15)
GLUCOSE BLD-MCNC: 121 MG/DL (ref 70–99)
GLUCOSE BLDC GLUCOMTR-MCNC: 112 MG/DL (ref 70–99)
HCT VFR BLD AUTO: 34.6 %
HGB BLD-MCNC: 11 G/DL
IMM GRANULOCYTES # BLD AUTO: 0.02 X10(3) UL (ref 0–1)
IMM GRANULOCYTES NFR BLD: 0.3 %
LACTATE SERPL-SCNC: 0.8 MMOL/L (ref 0.5–2)
LYMPHOCYTES # BLD AUTO: 1.83 X10(3) UL (ref 1–4)
LYMPHOCYTES NFR BLD AUTO: 28.8 %
MCH RBC QN AUTO: 23.8 PG (ref 26–34)
MCHC RBC AUTO-ENTMCNC: 31.8 G/DL (ref 31–37)
MCV RBC AUTO: 74.7 FL
MONOCYTES # BLD AUTO: 0.68 X10(3) UL (ref 0.1–1)
MONOCYTES NFR BLD AUTO: 10.7 %
NEUTROPHILS # BLD AUTO: 3.63 X10 (3) UL (ref 1.5–7.7)
NEUTROPHILS # BLD AUTO: 3.63 X10(3) UL (ref 1.5–7.7)
NEUTROPHILS NFR BLD AUTO: 57.2 %
OSMOLALITY SERPL CALC.SUM OF ELEC: 296 MOSM/KG (ref 275–295)
PLATELET # BLD AUTO: 254 10(3)UL (ref 150–450)
POTASSIUM SERPL-SCNC: 3.7 MMOL/L (ref 3.5–5.1)
RBC # BLD AUTO: 4.63 X10(6)UL
SODIUM SERPL-SCNC: 142 MMOL/L (ref 136–145)
WBC # BLD AUTO: 6.4 X10(3) UL (ref 4–11)

## 2024-07-05 PROCEDURE — 99222 1ST HOSP IP/OBS MODERATE 55: CPT | Performed by: HOSPITALIST

## 2024-07-05 PROCEDURE — 74176 CT ABD & PELVIS W/O CONTRAST: CPT | Performed by: EMERGENCY MEDICINE

## 2024-07-05 RX ORDER — ENOXAPARIN SODIUM 100 MG/ML
0.5 INJECTION SUBCUTANEOUS EVERY 12 HOURS
Status: DISCONTINUED | OUTPATIENT
Start: 2024-07-05 | End: 2024-07-08

## 2024-07-05 RX ORDER — SODIUM CHLORIDE 9 MG/ML
INJECTION, SOLUTION INTRAVENOUS CONTINUOUS
Status: DISCONTINUED | OUTPATIENT
Start: 2024-07-05 | End: 2024-07-07

## 2024-07-05 RX ORDER — DEXTROSE MONOHYDRATE 25 G/50ML
50 INJECTION, SOLUTION INTRAVENOUS
Status: DISCONTINUED | OUTPATIENT
Start: 2024-07-05 | End: 2024-07-08

## 2024-07-05 RX ORDER — MORPHINE SULFATE 4 MG/ML
4 INJECTION, SOLUTION INTRAMUSCULAR; INTRAVENOUS EVERY 2 HOUR PRN
Status: DISCONTINUED | OUTPATIENT
Start: 2024-07-05 | End: 2024-07-08

## 2024-07-05 RX ORDER — ALBUTEROL SULFATE 90 UG/1
1 AEROSOL, METERED RESPIRATORY (INHALATION) EVERY 4 HOURS PRN
COMMUNITY
Start: 2024-03-31

## 2024-07-05 RX ORDER — MORPHINE SULFATE 2 MG/ML
1 INJECTION, SOLUTION INTRAMUSCULAR; INTRAVENOUS EVERY 2 HOUR PRN
Status: DISCONTINUED | OUTPATIENT
Start: 2024-07-05 | End: 2024-07-08

## 2024-07-05 RX ORDER — PROCHLORPERAZINE EDISYLATE 5 MG/ML
5 INJECTION INTRAMUSCULAR; INTRAVENOUS EVERY 8 HOURS PRN
Status: DISCONTINUED | OUTPATIENT
Start: 2024-07-05 | End: 2024-07-08

## 2024-07-05 RX ORDER — NICOTINE POLACRILEX 4 MG
15 LOZENGE BUCCAL
Status: DISCONTINUED | OUTPATIENT
Start: 2024-07-05 | End: 2024-07-08

## 2024-07-05 RX ORDER — ACETAMINOPHEN 500 MG
1000 TABLET ORAL ONCE
Status: COMPLETED | OUTPATIENT
Start: 2024-07-05 | End: 2024-07-05

## 2024-07-05 RX ORDER — TEMAZEPAM 15 MG/1
15 CAPSULE ORAL NIGHTLY PRN
Status: DISCONTINUED | OUTPATIENT
Start: 2024-07-05 | End: 2024-07-08

## 2024-07-05 RX ORDER — NICOTINE POLACRILEX 4 MG
30 LOZENGE BUCCAL
Status: DISCONTINUED | OUTPATIENT
Start: 2024-07-05 | End: 2024-07-08

## 2024-07-05 RX ORDER — ACETAMINOPHEN 500 MG
500 TABLET ORAL EVERY 4 HOURS PRN
Status: DISCONTINUED | OUTPATIENT
Start: 2024-07-05 | End: 2024-07-07

## 2024-07-05 RX ORDER — MORPHINE SULFATE 2 MG/ML
2 INJECTION, SOLUTION INTRAMUSCULAR; INTRAVENOUS EVERY 2 HOUR PRN
Status: DISCONTINUED | OUTPATIENT
Start: 2024-07-05 | End: 2024-07-08

## 2024-07-05 RX ORDER — ONDANSETRON 2 MG/ML
4 INJECTION INTRAMUSCULAR; INTRAVENOUS EVERY 6 HOURS PRN
Status: DISCONTINUED | OUTPATIENT
Start: 2024-07-05 | End: 2024-07-08

## 2024-07-05 NOTE — ED QUICK NOTES
Pt discharged to home. Instructed on the importance of follow-up with referral if provided, take any prescribed medications as instructed, drink plenty of fluids, and return sooner with any worsening of symptoms. All questions answered prior to disposition. Pt ambulatory out of the ER with steady gait.

## 2024-07-05 NOTE — ED QUICK NOTES
Orders for admission, patient is aware of plan and ready to go upstairs. Any questions, please call ED RN babar at extension [phone].     Patient Covid vaccination status: Unvaccinated     COVID Test Ordered in ED: None    COVID Suspicion at Admission: N/A    Running Infusions:  None    Mental Status/LOC at time of transport: aox4    Other pertinent information:   CIWA score: N/A   NIH score:  N/A     self

## 2024-07-05 NOTE — ED PROVIDER NOTES
[facility]  Emergency Department Attending Note     Chief Complaint:   Chief Complaint   Patient presents with    Urinary Symptoms     HISTORY OF PRESENT ILLNESS:   Juan Manuel Romero is a 58 year old female who presents to the ED with a past medical history including diabetes, hypertension, asthma presents with complaint of dysuria urgency and frequency worsening over the last few days.  States she was diagnosed with a UTI 3 days ago and her primary doctor called in a prescription however she has not been able to fill it.  Over the last few days she has been feeling worse with some bodyaches and chills but no fever.  She states she has some soreness in her back bilaterally but denies any bowel or bladder incontinence or retention or saddle anesthesia.  Denies any abdominal pain to me.  Denies any nausea vomiting diarrhea.     MEDICAL & SOCIAL HISTORY:   Past Medical History:    Asthma (HCC)    Diabetes (HCC)    Essential hypertension    Gout    History reviewed. No pertinent surgical history.   Social History     Socioeconomic History    Marital status:    Tobacco Use    Smoking status: Never    Smokeless tobacco: Never   Substance and Sexual Activity    Alcohol use: Not Currently    Drug use: Not Currently     Social Determinants of Health      Received from [facility]    No Known Allergies   Current Outpatient Medications   Medication Sig Dispense Refill    cephalexin 500 MG Oral Cap Take 1 capsule (500 mg total) by mouth 2 (two) times daily for 10 days. 20 capsule 0    cephALEXin 250 MG/5ML Oral Recon Susp Take by mouth 4 (four) times daily.      lisinopril 10 MG Oral Tab Take 10 mg by mouth daily. (Patient not taking: Reported on 10/30/2021)      allopurinol 100 MG Oral Tab Take 100 mg by mouth daily. (Patient not taking: Reported on 10/30/2021)      metFORMIN HCl 1000 MG Oral Tab Take 1,000 mg by mouth 2 (two) times daily with meals. (Patient not taking: Reported on 10/30/2021)             REVIEW OF SYSTEMS   A 10 point review of systems was completed and is negative except as listed in history of present illness      PHYSICAL EXAM   Vitals: /70   Pulse 103   Temp 99.3 °F (37.4 °C) (Temporal)   Resp 16   Ht 157.5 cm (5' 2\")   Wt 122.5 kg   SpO2 96%   BMI 49.38 kg/m²   /70   Pulse 103   Temp 99.3 °F (37.4 °C) (Temporal)   Resp 16   Ht 157.5 cm (5' 2\")   Wt 122.5 kg   SpO2 96%   BMI 49.38 kg/m²     General: A&Ox3, NAD  Constitutional: Well developed, well nourished, nontoxic  Head: atraumatic, normocephalic   Eyes: conjuctiva clear, no icterus, PERRL, EOMI, vision grossly normal  Ears: normal external appearance, no drainage  Nose:  Atraumatic, no swelling, no drainage, nares patent  Throat:  Moist pink mucous membranes, airway is patent  Neck:  Soft supple, no masses, no tracheal deviation, no stridor  Chest:  No bruising or abrasions, no tenderness, no deformity  Cardiac:  Regular rate and rhythm, no murmurs rubs or gallops.  Lung:  No distress, no retractions. Clear to auscultation bilaterally, no w/r/r  Abdomen:  Soft, nontender, nondistended, normal BS  Back: No stepoff/deformity no CVA tenderness  Extremities: FROM all ext, no deformities, intact equal peripheral pulses, no cyanosis or edema  Neuro: No facial droop, no slurred speech, moving all extremities freely, SILT to the bilateral upper and lower extremities  Psych: A&Ox3, normal affect, cooperative, calm  Skin: No rash, no petechiae/purpura, warm, dry      RESULTS  LABS:   Results for orders placed or performed during the hospital encounter of 07/04/24   Comp Metabolic Panel (14)   Result Value Ref Range    Glucose 147 (H) 70 - 99 mg/dL    Sodium 143 136 - 145 mmol/L    Potassium 3.9 3.5 - 5.1 mmol/L    Chloride 108 98 - 112 mmol/L    CO2 27.0 21.0 - 32.0 mmol/L    Anion Gap 8 0 - 18 mmol/L    BUN 15 9 - 23 mg/dL    Creatinine 1.06 (H) 0.55 - 1.02 mg/dL    BUN/CREA Ratio 14.2 10.0 - 20.0    Calcium, Total  9.3 8.7 - 10.4 mg/dL    Calculated Osmolality 300 (H) 275 - 295 mOsm/kg    eGFR-Cr 60 >=60 mL/min/1.73m2    ALT 18 10 - 49 U/L    AST 20 <=34 U/L    Alkaline Phosphatase 93 46 - 118 U/L    Bilirubin, Total 0.4 0.2 - 1.1 mg/dL    Total Protein 6.9 5.7 - 8.2 g/dL    Albumin 4.1 3.2 - 4.8 g/dL    Globulin  2.8 2.0 - 3.5 g/dL    A/G Ratio 1.5 1.0 - 2.0   Urinalysis with Culture Reflex    Specimen: Urine, clean catch   Result Value Ref Range    Urine Color Dark-Yellow Yellow    Clarity Urine Ex.Turbid (A) Clear    Spec Gravity 1.019 1.005 - 1.030    Glucose Urine 30 (A) Normal mg/dL    Bilirubin Urine      Ketones Urine Negative Negative mg/dL    Blood Urine 3+ (A) Negative    pH Urine 6.0 5.0 - 8.0    Protein Urine 300 (A) Negative mg/dL    Urobilinogen Urine 2 (A) Normal    Nitrite Urine 2+ (A) Negative    Leukocyte Esterase Urine 500 (A) Negative    WBC Urine >50 (A) 0 - 5 /HPF    RBC Urine >10 (A) 0 - 2 /HPF    Bacteria Urine 1+ (A) None Seen /HPF    Squamous Epi. Cells None Seen None Seen /HPF    Renal Tubular Epithelial Cells None Seen None Seen /HPF    Transitional Cells None Seen None Seen /HPF    Yeast Urine None Seen None Seen /HPF    WBC Clump Present (A) None Seen /HPF   Lactic Acid, Plasma   Result Value Ref Range    Lactic Acid 2.7 (H) 0.5 - 2.0 mmol/L   Lactic Acid Reflex Post Positive   Result Value Ref Range    Lactic Acid 1.9 0.5 - 2.0 mmol/L   Ictotest   Result Value Ref Range    Ictotest Negative Negative   CBC W/ DIFFERENTIAL   Result Value Ref Range    WBC 7.6 4.0 - 11.0 x10(3) uL    RBC 5.24 3.80 - 5.30 x10(6)uL    HGB 12.3 12.0 - 16.0 g/dL    HCT 40.4 35.0 - 48.0 %    MCV 77.1 (L) 80.0 - 100.0 fL    MCH 23.5 (L) 26.0 - 34.0 pg    MCHC 30.4 (L) 31.0 - 37.0 g/dL    RDW-SD 41.7 35.1 - 46.3 fL    RDW 15.2 (H) 11.0 - 15.0 %    .0 150.0 - 450.0 10(3)uL    Neutrophil Absolute Prelim 6.29 1.50 - 7.70 x10 (3) uL    Neutrophil Absolute 6.29 1.50 - 7.70 x10(3) uL    Lymphocyte Absolute 1.06 1.00 -  4.00 x10(3) uL    Monocyte Absolute 0.12 0.10 - 1.00 x10(3) uL    Eosinophil Absolute 0.09 0.00 - 0.70 x10(3) uL    Basophil Absolute 0.03 0.00 - 0.20 x10(3) uL    Immature Granulocyte Absolute 0.03 0.00 - 1.00 x10(3) uL    Neutrophil % 82.5 %    Lymphocyte % 13.9 %    Monocyte % 1.6 %    Eosinophil % 1.2 %    Basophil % 0.4 %    Immature Granulocyte % 0.4 %         IMAGING: No results found.        Procedures:   Procedures       ED COURSE          Re-Evaluation: Improved      Disposition & Plan:   Clinical Impression/Final Diagnosis:   1. Urinary tract infection without hematuria, site unspecified    2. Lactic acidosis        Medical Decision Making: Exam is concerning for urinary tract infection with progression to pyelonephritis.  The patient arrived with a heart rate in the low 100s, but she has no leukocytosis or fever.  After IV fluids her initial lactic acid that was elevated has come down.  Her tachycardia has resolved and the heart rate is in the 90s.  She remains afebrile.  She reports she is feeling much better after a dose of Rocephin in the emergency department.  As such I do feel that outpatient management is reasonable at this time however I have counseled the patient and her family at the bedside that very close follow-up is extremely important.  I recommend follow-up in the next day or 2 and to return immediately any worsening symptoms or new concerns with the patient and the family member at the bedside verbalized understanding and agreement with this plan.    Medical Decision Making  Amount and/or Complexity of Data Reviewed  Independent Historian:      Details: Son at the bedside  External Data Reviewed: notes.  Labs: ordered. Decision-making details documented in ED Course.    Risk  OTC drugs.  Prescription drug management.  Decision regarding hospitalization.    Critical Care  Total time providing critical care: 42 minutes        Disposition: Discharge  There are no disposition comments on file  for this visit.     This note was generated in part using voice recognition dictation technology. The report was reviewed by this physician but still may have unintentional errors due to inherent limitations of voice recognition technology. All times are estimates.

## 2024-07-05 NOTE — ED QUICK NOTES
Care endorsed from Vicky GARZA RN. Pt resting on stretcher, on monitors. No current needs at this time.

## 2024-07-05 NOTE — ED INITIAL ASSESSMENT (HPI)
Pt presents to the ER stating she was told to come in for positive blood cultures. C/o HA and chills

## 2024-07-05 NOTE — ED PROVIDER NOTES
Patient Seen in: [facility] Emergency Department      History     Chief Complaint   Patient presents with    Abnormal Result     Stated Complaint: + blood cultures    Subjective:   HPI  58 yoF with hypertension, gout, diabetes who presents for evaluation of positive blood cultures.  She was seen in the ER yesterday and diagnosed with UTI.  She received Rocephin in the hospital.  She was not able to  the Keflex from the pharmacy and has not had any antibiotics since leaving the hospital yesterday.  Today she has fatigue, lightheadedness and headaches.  She has dysuria.  She was called to return to the ED for positive blood cultures that were drawn yesterday.      Objective:   Past Medical History:    Asthma (HCC)    Diabetes (HCC)    Essential hypertension    Gout              History reviewed. No pertinent surgical history.             Social History     Socioeconomic History    Marital status:    Tobacco Use    Smoking status: Never    Smokeless tobacco: Never   Substance and Sexual Activity    Alcohol use: Not Currently    Drug use: Not Currently     Social Determinants of Health      Received from [facility]              Review of Systems    Positive for stated Chief Complaint: Abnormal Result    Other systems are as noted in HPI.  Constitutional and vital signs reviewed.      All other systems reviewed and negative except as noted above.    Physical Exam     ED Triage Vitals [07/05/24 1307]   /89   Pulse 75   Resp 18   Temp 98.2 °F (36.8 °C)   Temp src Oral   SpO2 98 %   O2 Device None (Room air)       Current Vitals:   Vital Signs  BP: 129/79  Pulse: 71  Resp: 18  Temp: 98.2 °F (36.8 °C)  Temp src: Oral  MAP (mmHg): 96    Oxygen Therapy  SpO2: 97 %  O2 Device: None (Room air)            Physical Exam  Vitals and nursing note reviewed.   Constitutional:       Appearance: She is well-developed.   HENT:      Head: Normocephalic and atraumatic.      Mouth/Throat:       Mouth: Mucous membranes are moist.      Pharynx: Oropharynx is clear.   Eyes:      Extraocular Movements: Extraocular movements intact.      Pupils: Pupils are equal, round, and reactive to light.   Cardiovascular:      Rate and Rhythm: Normal rate and regular rhythm.      Heart sounds: Normal heart sounds.   Pulmonary:      Effort: Pulmonary effort is normal.      Breath sounds: Normal breath sounds.   Abdominal:      General: There is no distension.      Palpations: Abdomen is soft.      Tenderness: There is no abdominal tenderness. There is no right CVA tenderness or left CVA tenderness.   Musculoskeletal:         General: Normal range of motion.      Cervical back: Normal range of motion and neck supple. No tenderness.   Skin:     General: Skin is warm.      Capillary Refill: Capillary refill takes less than 2 seconds.   Neurological:      Mental Status: She is alert.      Comments: No focal deficits       Differential diagnosis includes but is not limited to cystitis, pyelonephritis, bacteremia, contamination        ED Course     Labs Reviewed   BASIC METABOLIC PANEL (8) - Abnormal; Notable for the following components:       Result Value    Glucose 121 (*)     Calculated Osmolality 296 (*)     All other components within normal limits   CBC W/ DIFFERENTIAL - Abnormal; Notable for the following components:    HGB 11.0 (*)     HCT 34.6 (*)     MCV 74.7 (*)     MCH 23.8 (*)     RDW 15.4 (*)     All other components within normal limits   LACTIC ACID, PLASMA - Normal   CBC WITH DIFFERENTIAL WITH PLATELET    Narrative:     The following orders were created for panel order CBC With Differential With Platelet.  Procedure                               Abnormality         Status                     ---------                               -----------         ------                     CBC W/ DIFFERENTIAL[[id number]]          Abnormal            Final result                 Please view results for these tests on the  individual orders.   BLOOD CULTURE   BLOOD CULTURE        CT ABDOMEN+PELVIS KIDNEYSTONE 2D RNDR(NO IV,NO ORAL)(CPT=74176)    Result Date: 7/5/2024  CONCLUSION:  1. No evidence of nephroureterolithiasis or obstructive uropathy.  2. Bladder wall thickening with perivesical fat stranding may be indicative underlying cystitis.  3. Nonspecific right-sided perinephric/periureteral fat stranding, which could reflect ascending urinary tract infection in the appropriate clinical setting. Please note that pyelonephritis is a largely clinical diagnosis, and imaging studies have a limited role and clinical utility for this particular indication.  4. Cholelithiasis without CT evidence acute complication.  5. Uncomplicated distal colonic diverticulosis.  6. Hepatosplenomegaly.  7. Left adrenal adenoma.  8. Status post previous gastric bypass.  9. Low-density appearance of the intracardiac blood pool raises the possibility of underlying anemia. Correlate with hematologic parameters.  10. Lesser incidental findings as above.    Dictated by (CST): Jarocho Nayak MD on 7/05/2024 at 3:59 PM     Finalized by (CST): Jarocho Nayak MD on 7/05/2024 at 4:07 PM                   MDM             Medical Decision Making  Vitals are stable in the ED.  BMP with normal renal function.  Lactic acid normal.  CBC without leukocytosis.  Per my independent interpretation of CT ab pelvis there is no obstructing ureteral stone.  She will be treated with Rocephin. Blood cultures repeated today.    D/w Dr. Shetty for ID consultation-  agrees with rocephin.  D/w Dr. Nava for admission.        Problems Addressed:  Acute cystitis without hematuria: complicated acute illness or injury with systemic symptoms that poses a threat to life or bodily functions    Amount and/or Complexity of Data Reviewed  External Data Reviewed: labs.     Details:   Reviewed urine culture from 7/4/2024 which showed greater than 100,000 CFU of E. coli, sensitivities pending.   Reviewed 7/4/24 blood culture 1 of 2 sets positive for gram-negative rods.    Labs: ordered. Decision-making details documented in ED Course.  Discussion of management or test interpretation with external provider(s): As above    Risk  Decision regarding hospitalization.        Disposition and Plan     Clinical Impression:  1. Acute cystitis without hematuria         Disposition:  Admit  7/5/2024  4:17 pm    Follow-up:  No follow-up provider specified.        Medications Prescribed:  Current Discharge Medication List                            Hospital Problems       Present on Admission             ICD-10-CM Noted POA    * (Principal) Acute cystitis without hematuria N30.00 7/5/2024 Unknown

## 2024-07-05 NOTE — ED QUICK NOTES
Discharge education given to patient and daughter via AVS. Both are able to teach back. Patient understands importance of completing keflex course. Patient ambulated to exit.

## 2024-07-05 NOTE — H&P
[facility]    PATIENT'S NAME: ALICE BOLANOS   ATTENDING PHYSICIAN: Jennifer Coombs MD   PATIENT ACCOUNT#:   [id number]    LOCATION:  [address]  MEDICAL RECORD #:   [id number]       YOB: 1965  ADMISSION DATE:       07/05/2024    HISTORY AND PHYSICAL EXAMINATION    CHIEF COMPLAINT:  Gram-negative gabe bacteremia.     HISTORY OF PRESENT ILLNESS:  Patient is a 58-year-old  female who was seen yesterday in the emergency department for evaluation of urinary tract infection symptoms and lower back/lower pelvic pain.  She was diagnosed with urinary tract infection and discharged home on antibiotics.  Today, she was called back for positive urine culture for E coli and positive blood cultures for gram-negative rods.  Repeat CBC and culture, urinalysis still pending.  CT scan of the abdomen ordered, rule out kidney stones.  Started on IV Rocephin, and she will be admitted to the hospital for further management.     PAST MEDICAL HISTORY:  Gout, morbid obesity, hypertension, asthma, diabetes mellitus type 2.    PAST SURGICAL HISTORY:  Sleeve gastrectomy and ventral hernia repair.     MEDICATIONS:  Please see medication reconciliation list.     FAMILY HISTORY:  Positive for hypertension.     SOCIAL HISTORY:  No tobacco, alcohol, or drug use.  Lives with her family.  Independent for basic activities of daily living.     REVIEW OF SYSTEMS:  Currently resting in bed.  She said for the last 4 to 5 days she has been having dysuria, lower pelvic discomfort, and lower back discomfort.  She had fever and chills at home.  Other 12-point review of systems is negative.       PHYSICAL EXAMINATION:    GENERAL:  Alert and oriented to time, place and person.  No acute distress.   VITAL SIGNS:  Temperature 98.2, pulse 75, respiratory rate 18, blood pressure 158/89, pulse ox 98% on room air.  HEENT:  Atraumatic.  Oropharynx clear.  Moist mucous membranes.  Ears and nose normal.  Eyes:  Anicteric sclerae.     NECK:  Supple.  No lymphadenopathy.  Trachea midline.  Full range of motion.   LUNGS:  Clear to auscultation bilaterally.  Normal respiratory effort.    HEART:  Regular rate and rhythm.  S1 and S2 auscultated.  No murmur.    ABDOMEN:  Soft, nondistended.  Obese.  Positive bowel sounds.  Costovertebral angle tenderness positive on the right side.   EXTREMITIES:  No peripheral edema, clubbing or cyanosis.   NEUROLOGIC:  Motor and sensory intact.      ASSESSMENT:    1.   Urinary tract infection, possible right pyelonephritis, rule out kidney stone, with gram-negative gabe bacteremia.    2.   Morbid obesity.  3.   Diabetes mellitus type 2.   4.   Hypertension.    PLAN:  Patient will be admitted to general medical floor.  IV Rocephin.  IV fluids.   Follow up on imaging studies of the abdomen.  Rule out kidney stones.  Obtain ID consult.  Monitor Accu-Cheks.  Further recommendations to follow.      Dictated By Bryn Nava MD  d: 07/05/2024 14:40:02  t: 07/05/2024 15:20:40  Job [id number]  FB/

## 2024-07-06 LAB
ANION GAP SERPL CALC-SCNC: 7 MMOL/L (ref 0–18)
BASOPHILS # BLD AUTO: 0.03 X10(3) UL (ref 0–0.2)
BASOPHILS NFR BLD AUTO: 0.7 %
BUN BLD-MCNC: 12 MG/DL (ref 9–23)
BUN/CREAT SERPL: 16.9 (ref 10–20)
CALCIUM BLD-MCNC: 8.4 MG/DL (ref 8.7–10.4)
CHLORIDE SERPL-SCNC: 111 MMOL/L (ref 98–112)
CO2 SERPL-SCNC: 26 MMOL/L (ref 21–32)
CREAT BLD-MCNC: 0.71 MG/DL
DEPRECATED RDW RBC AUTO: 41.1 FL (ref 35.1–46.3)
EGFRCR SERPLBLD CKD-EPI 2021: 97 ML/MIN/1.73M2 (ref 60–?)
EOSINOPHIL # BLD AUTO: 0.12 X10(3) UL (ref 0–0.7)
EOSINOPHIL NFR BLD AUTO: 2.6 %
ERYTHROCYTE [DISTWIDTH] IN BLOOD BY AUTOMATED COUNT: 15.1 % (ref 11–15)
EST. AVERAGE GLUCOSE BLD GHB EST-MCNC: 169 MG/DL (ref 68–126)
GLUCOSE BLD-MCNC: 138 MG/DL (ref 70–99)
GLUCOSE BLDC GLUCOMTR-MCNC: 113 MG/DL (ref 70–99)
GLUCOSE BLDC GLUCOMTR-MCNC: 125 MG/DL (ref 70–99)
GLUCOSE BLDC GLUCOMTR-MCNC: 136 MG/DL (ref 70–99)
GLUCOSE BLDC GLUCOMTR-MCNC: 166 MG/DL (ref 70–99)
HBA1C MFR BLD: 7.5 % (ref ?–5.7)
HCT VFR BLD AUTO: 32.6 %
HGB BLD-MCNC: 10.2 G/DL
IMM GRANULOCYTES # BLD AUTO: 0.01 X10(3) UL (ref 0–1)
IMM GRANULOCYTES NFR BLD: 0.2 %
LYMPHOCYTES # BLD AUTO: 1.23 X10(3) UL (ref 1–4)
LYMPHOCYTES NFR BLD AUTO: 27 %
MCH RBC QN AUTO: 23.7 PG (ref 26–34)
MCHC RBC AUTO-ENTMCNC: 31.3 G/DL (ref 31–37)
MCV RBC AUTO: 75.6 FL
MONOCYTES # BLD AUTO: 0.49 X10(3) UL (ref 0.1–1)
MONOCYTES NFR BLD AUTO: 10.8 %
NEUTROPHILS # BLD AUTO: 2.67 X10 (3) UL (ref 1.5–7.7)
NEUTROPHILS # BLD AUTO: 2.67 X10(3) UL (ref 1.5–7.7)
NEUTROPHILS NFR BLD AUTO: 58.7 %
OSMOLALITY SERPL CALC.SUM OF ELEC: 300 MOSM/KG (ref 275–295)
PLATELET # BLD AUTO: 223 10(3)UL (ref 150–450)
POTASSIUM SERPL-SCNC: 3.8 MMOL/L (ref 3.5–5.1)
RBC # BLD AUTO: 4.31 X10(6)UL
SODIUM SERPL-SCNC: 144 MMOL/L (ref 136–145)
WBC # BLD AUTO: 4.6 X10(3) UL (ref 4–11)

## 2024-07-06 PROCEDURE — 99233 SBSQ HOSP IP/OBS HIGH 50: CPT | Performed by: HOSPITALIST

## 2024-07-06 RX ORDER — HYDRALAZINE HYDROCHLORIDE 20 MG/ML
10 INJECTION INTRAMUSCULAR; INTRAVENOUS EVERY 4 HOURS PRN
Status: DISCONTINUED | OUTPATIENT
Start: 2024-07-06 | End: 2024-07-08

## 2024-07-06 NOTE — PROGRESS NOTES
[facility]  part of [facility]    Progress Note    Juan Manuel Romero Patient Status:  Inpatient    1965 MRN [id number]   Location [facility] 5SW/SE Attending Ceci Menjivar,*   Hosp Day # 1 PCP Shahid Harrell     Chief Complaint: headache    Subjective:     Constitutional:  Positive for fatigue. Negative for appetite change and chills.   HENT:  Negative for congestion.    Respiratory:  Negative for choking, chest tightness and shortness of breath.    Cardiovascular:  Negative for leg swelling.   Gastrointestinal:  Negative for abdominal pain.   Genitourinary:  Negative for dysuria.   Neurological:  Positive for headaches.   Psychiatric/Behavioral:  Negative for hallucinations, confusion and decreased concentration. The patient is not nervous/anxious and is not hyperactive.        Objective:   Blood pressure 136/84, pulse 82, temperature 99.2 °F (37.3 °C), temperature source Oral, resp. rate 18, height 5' 2\" (1.575 m), weight 268 lb 6.4 oz (121.7 kg), SpO2 97%.  Physical Exam  Constitutional:       General: She is not in acute distress.     Appearance: She is obese. She is not ill-appearing, toxic-appearing or diaphoretic.   HENT:      Head: Normocephalic and atraumatic.   Cardiovascular:      Rate and Rhythm: Normal rate and regular rhythm.      Pulses: Normal pulses.   Pulmonary:      Effort: Pulmonary effort is normal.   Abdominal:      General: Bowel sounds are normal.      Palpations: Abdomen is soft.   Skin:     General: Skin is warm and dry.      Capillary Refill: Capillary refill takes less than 2 seconds.   Neurological:      General: No focal deficit present.      Mental Status: She is alert.   Psychiatric:         Behavior: Behavior normal.         Judgment: Judgment normal.         Results:   Lab Results   Component Value Date    WBC 4.6 2024    HGB 10.2 (L) 2024    HCT 32.6 (L) 2024    .0 2024    CREATSERUM 0.71 2024     BUN 12 07/06/2024     07/06/2024    K 3.8 07/06/2024     07/06/2024    CO2 26.0 07/06/2024     (H) 07/06/2024    CA 8.4 (L) 07/06/2024    ALB 4.1 07/04/2024    ALKPHO 93 07/04/2024    BILT 0.4 07/04/2024    TP 6.9 07/04/2024    AST 20 07/04/2024    ALT 18 07/04/2024     10/30/2021     10/30/2021       CT ABDOMEN+PELVIS KIDNEYSTONE 2D RNDR(NO IV,NO ORAL)(CPT=74176)    Result Date: 7/5/2024  CONCLUSION:  1. No evidence of nephroureterolithiasis or obstructive uropathy.  2. Bladder wall thickening with perivesical fat stranding may be indicative underlying cystitis.  3. Nonspecific right-sided perinephric/periureteral fat stranding, which could reflect ascending urinary tract infection in the appropriate clinical setting. Please note that pyelonephritis is a largely clinical diagnosis, and imaging studies have a limited role and clinical utility for this particular indication.  4. Cholelithiasis without CT evidence acute complication.  5. Uncomplicated distal colonic diverticulosis.  6. Hepatosplenomegaly.  7. Left adrenal adenoma.  8. Status post previous gastric bypass.  9. Low-density appearance of the intracardiac blood pool raises the possibility of underlying anemia. Correlate with hematologic parameters.  10. Lesser incidental findings as above.    Dictated by (CST): Jarocho Nayak MD on 7/05/2024 at 3:59 PM     Finalized by (CST): Jarocho Nayak MD on 7/05/2024 at 4:07 PM               Assessment & Plan:     1.       Sepsis Urinary tract infection probable pyelo, with gram-negative gabe bacteremia.  Await repeat bc; looks well  2.       Morbid obesity. Bmi=49.09 needs vale smith  3.       Diabetes mellitus type 2.   4.       Hypertension.    Complex mdm; coordinating care with nurse and counseling pt and with her permission her family in room about uti/sepsis    Ct images reviewed        AVTAR FORREST MD

## 2024-07-06 NOTE — SPIRITUAL CARE NOTE
spoke with Pt who stated that if she desires to complete the POA she will alert spiritual care to come up at a later time. Chaplains are here when needed. #[id number]    Chaplain Veronica Gross

## 2024-07-06 NOTE — PLAN OF CARE
Problem: Patient Centered Care  Goal: Patient preferences are identified and integrated in the patient's plan of care  Description: Interventions:  - What would you like us to know as we care for you?   - Provide timely, complete, and accurate information to patient/family  - Incorporate patient and family knowledge, values, beliefs, and cultural backgrounds into the planning and delivery of care  - Encourage patient/family to participate in care and decision-making at the level they choose  - Honor patient and family perspectives and choices  Outcome: Progressing     Problem: Diabetes/Glucose Control  Goal: Glucose maintained within prescribed range  Description: INTERVENTIONS:  - Monitor Blood Glucose as ordered  - Assess for signs and symptoms of hyperglycemia and hypoglycemia  - Administer ordered medications to maintain glucose within target range  - Assess barriers to adequate nutritional intake and initiate nutrition consult as needed  - Instruct patient on self management of diabetes  Outcome: Progressing     Problem: Patient/Family Goals  Goal: Patient/Family Long Term Goal  Description: Patient's Long Term Goal: to return home    Interventions:  - IV antibiotics  - See additional Care Plan goals for specific interventions  Outcome: Progressing  Goal: Patient/Family Short Term Goal  Description: Patient's Short Term Goal: to feel better    Interventions:   - follow MD recommendations  - See additional Care Plan goals for specific interventions  Outcome: Progressing     Problem: METABOLIC/FLUID AND ELECTROLYTES - ADULT  Goal: Glucose maintained within prescribed range  Description: INTERVENTIONS:  - Monitor Blood Glucose as ordered  - Assess for signs and symptoms of hyperglycemia and hypoglycemia  - Administer ordered medications to maintain glucose within target range  - Assess barriers to adequate nutritional intake and initiate nutrition consult as needed  - Instruct patient on self management of  diabetes  Outcome: Progressing  Goal: Electrolytes maintained within normal limits  Description: INTERVENTIONS:  - Monitor labs and rhythm and assess patient for signs and symptoms of electrolyte imbalances  - Administer electrolyte replacement as ordered  - Monitor response to electrolyte replacements, including rhythm and repeat lab results as appropriate  - Fluid restriction as ordered  - Instruct patient on fluid and nutrition restrictions as appropriate  Outcome: Progressing  Goal: Hemodynamic stability and optimal renal function maintained  Description: INTERVENTIONS:  - Monitor labs and assess for signs and symptoms of volume excess or deficit  - Monitor intake, output and patient weight  - Monitor urine specific gravity, serum osmolarity and serum sodium as indicated or ordered  - Monitor response to interventions for patient's volume status, including labs, urine output, blood pressure (other measures as available)  - Encourage oral intake as appropriate  - Instruct patient on fluid and nutrition restrictions as appropriate  Outcome: Progressing     Problem: SKIN/TISSUE INTEGRITY - ADULT  Goal: Skin integrity remains intact  Description: INTERVENTIONS  - Assess and document risk factors for pressure ulcer development  - Assess and document skin integrity  - Monitor for areas of redness and/or skin breakdown  - Initiate interventions, skin care algorithm/standards of care as needed  Outcome: Progressing

## 2024-07-06 NOTE — CONSULTS
[facility]  part of [facility] ID CONSULT NOTE    Juan Manuel Romero Patient Status:  Inpatient    1965 MRN [id number]   Location [facility] 5SW/SE Attending Bryn Nava MD   Hosp Day # 1 PCP Shahid Harrell       Reason for Consultation:  E.coli bacteremia    ASSESSMENT:    Antibiotics: Ceftriaxone    # E.coli UTI w/bacteremia  # Headache  # DM, HTN    PLAN:    -  Continue Ceftriaxone   -  FU repeat bcx  -  Follow fever curve, wbc  -  Reviewed labs, micro, imaging reports, available old records  -  D/w patient, family, RN    History of Present Illness:  Juan Manuel Romero is a a(n) 58 year old female with hx of DM, HTN, gout. Pt seen in ED  with dysuria, urinary urgency and frequency for past few days. She was dx with UTI by PCP and prescribed abx but unable to fill it. Her sx progressed with associated myalgias and chills but no fever. Also with back pain w/o incontinence,retention or paraesthesia. No abdom pain, n/v/d.At that time wbc 7.6. LA 2.7. UA >50wbc. Cr 1.06. Pt felt better s/p CTX dose and DC with keflex rx. Ucx returned with E.coli and Bcx also pos for E.col () and thus pt returns to ED  per guidance for further mgmt. Pt had not yet filled Keflex prescription. Pt continues to c/o dysuria. Also with fatigue and headaches.On admit, tm 99.2. WBC 6.4. Repeat bcx pending. CT A/P w/o stones hydronephrosis or obstructive uropathy. Noted with bladder wall thickening and pervesical fat stranding, nonspecific R perinephric fat stranding. Started on ctx. ID consulted.     History:  Past Medical History:    Asthma (HCC)    Diabetes (HCC)    Essential hypertension    Gout    High blood pressure     History reviewed. No pertinent surgical history.  History reviewed. No pertinent family history.   reports that she has never smoked. She has never used smokeless tobacco. She reports that she does not currently use alcohol. She reports that she does not currently  use drugs.    Allergies:  No Known Allergies    Medications:    Current Facility-Administered Medications:     sodium chloride 0.9% infusion, , Intravenous, Continuous    enoxaparin (Lovenox) 60 MG/0.6ML SUBQ injection 60 mg, 0.5 mg/kg, Subcutaneous, q12h    acetaminophen (Tylenol Extra Strength) tab 500 mg, 500 mg, Oral, Q4H PRN    ondansetron (Zofran) 4 MG/2ML injection 4 mg, 4 mg, Intravenous, Q6H PRN    prochlorperazine (Compazine) 10 MG/2ML injection 5 mg, 5 mg, Intravenous, Q8H PRN    morphINE PF 2 MG/ML injection 1 mg, 1 mg, Intravenous, Q2H PRN **OR** morphINE PF 2 MG/ML injection 2 mg, 2 mg, Intravenous, Q2H PRN **OR** morphINE PF 4 MG/ML injection 4 mg, 4 mg, Intravenous, Q2H PRN    temazepam (Restoril) cap 15 mg, 15 mg, Oral, Nightly PRN    cefTRIAXone (Rocephin) 1 g in sodium chloride 0.9% 100 mL IVPB-ADDV, 1 g, Intravenous, Q24H    glucose (Dex4) 15 GM/59ML oral liquid 15 g, 15 g, Oral, Q15 Min PRN **OR** glucose (Glutose) 40% oral gel 15 g, 15 g, Oral, Q15 Min PRN **OR** glucose-vitamin C (Dex-4) chewable tab 4 tablet, 4 tablet, Oral, Q15 Min PRN **OR** dextrose 50% injection 50 mL, 50 mL, Intravenous, Q15 Min PRN **OR** glucose (Dex4) 15 GM/59ML oral liquid 30 g, 30 g, Oral, Q15 Min PRN **OR** glucose (Glutose) 40% oral gel 30 g, 30 g, Oral, Q15 Min PRN **OR** glucose-vitamin C (Dex-4) chewable tab 8 tablet, 8 tablet, Oral, Q15 Min PRN    insulin aspart (NovoLOG) 100 Units/mL FlexPen 1-7 Units, 1-7 Units, Subcutaneous, TID CC    Review of Systems:  CONSTITUTIONAL:  No weight loss, weakness or fatigue.  HEENT:  Eyes:  No visual loss, blurred vision, double vision or yellow sclerae. Ears, Nose, Throat:  No hearing loss, sneezing, congestion, runny nose or sore throat.  SKIN:  No rash or itching.  CARDIOVASCULAR:  No chest pain, chest pressure or chest discomfort  RESPIRATORY:  No shortness of breath, cough or sputum.  GASTROINTESTINAL:  No anorexia, nausea, vomiting or diarrhea. No abdominal pain or  blood.  GENITOURINARY:  No Burning on urination.   NEUROLOGICAL:  +headache, dizziness, syncope, paralysis, ataxia, numbness or tingling in the extremities.  MUSCULOSKELETAL:  No muscle, back pain, joint pain or stiffness.    Physical Exam:  Vital signs: Blood pressure (!) 158/97, pulse 82, temperature 99.2 °F (37.3 °C), temperature source Oral, resp. rate 18, height 5' 2\" (1.575 m), weight 268 lb 6.4 oz (121.7 kg), SpO2 97%.    General: NAD, awake, sitting on couch  HEENT: Moist mucous membranes. EOMI  Neck: No lymphadenopathy.  Supple.  Cardiovascular: RRR, No chest wall tenderness  Respiratory: Symmetric expansion, lungs clear  Abdomen: Soft, nontender, nondistended.   Musculoskeletal: No edema noted  Integument: No lesions. No erythema.    Laboratory Data: Reviewed in EMR    Microbiology: Reviewed in EMR    Radiology: Reviewed    Thank you for allowing us to participate in the care of this patient. Please do not hesitate to call if you have any questions.     We will continue to follow with you and will make further recommendations based on his progress.    [facility]  [phone]  7/6/2024

## 2024-07-06 NOTE — PLAN OF CARE
Problem: Patient Centered Care  Goal: Patient preferences are identified and integrated in the patient's plan of care  Description: Interventions:  - What would you like us to know as we care for you?   - Provide timely, complete, and accurate information to patient/family  - Incorporate patient and family knowledge, values, beliefs, and cultural backgrounds into the planning and delivery of care  - Encourage patient/family to participate in care and decision-making at the level they choose  - Honor patient and family perspectives and choices  Outcome: Progressing     Problem: Diabetes/Glucose Control  Goal: Glucose maintained within prescribed range  Description: INTERVENTIONS:  - Monitor Blood Glucose as ordered  - Assess for signs and symptoms of hyperglycemia and hypoglycemia  - Administer ordered medications to maintain glucose within target range  - Assess barriers to adequate nutritional intake and initiate nutrition consult as needed  - Instruct patient on self management of diabetes  Outcome: Progressing     Problem: Patient/Family Goals  Goal: Patient/Family Long Term Goal  Description: Patient's Long Term Goal:     Interventions:  - See additional Care Plan goals for specific interventions  Outcome: Progressing  Goal: Patient/Family Short Term Goal  Description: Patient's Short Term Goal:     Interventions:   - See additional Care Plan goals for specific interventions  Outcome: Progressing     Problem: METABOLIC/FLUID AND ELECTROLYTES - ADULT  Goal: Glucose maintained within prescribed range  Description: INTERVENTIONS:  - Monitor Blood Glucose as ordered  - Assess for signs and symptoms of hyperglycemia and hypoglycemia  - Administer ordered medications to maintain glucose within target range  - Assess barriers to adequate nutritional intake and initiate nutrition consult as needed  - Instruct patient on self management of diabetes  Outcome: Progressing  Goal: Electrolytes maintained within normal  limits  Description: INTERVENTIONS:  - Monitor labs and rhythm and assess patient for signs and symptoms of electrolyte imbalances  - Administer electrolyte replacement as ordered  - Monitor response to electrolyte replacements, including rhythm and repeat lab results as appropriate  - Fluid restriction as ordered  - Instruct patient on fluid and nutrition restrictions as appropriate  Outcome: Progressing  Goal: Hemodynamic stability and optimal renal function maintained  Description: INTERVENTIONS:  - Monitor labs and assess for signs and symptoms of volume excess or deficit  - Monitor intake, output and patient weight  - Monitor urine specific gravity, serum osmolarity and serum sodium as indicated or ordered  - Monitor response to interventions for patient's volume status, including labs, urine output, blood pressure (other measures as available)  - Encourage oral intake as appropriate  - Instruct patient on fluid and nutrition restrictions as appropriate  Outcome: Progressing     Problem: SKIN/TISSUE INTEGRITY - ADULT  Goal: Skin integrity remains intact  Description: INTERVENTIONS  - Assess and document risk factors for pressure ulcer development  - Assess and document skin integrity  - Monitor for areas of redness and/or skin breakdown  - Initiate interventions, skin care algorithm/standards of care as needed  Outcome: Progressing   Scheduled medications given- see MAR, bed at lowest position,  and call light within reach. Frequent staff rounding.

## 2024-07-06 NOTE — DIABETES ED
[facility]    Diabetes Education  Note    Juan Manuel Romero Patient Status:  Inpatient   1965 MRN [id number]  Location [facility] 5SW/SE Attending Ceci Menjivar,*  Hosp Day # 1 PCP Shahid Harrell      Labs:    HgbA1C (%)   Date Value   2024 7.5 (H)       Reason for Visit:  Consult for instruction on BG meter    Pt states that she has checked her sugars at home in the past but she has had difficulty with the lancing device and getting blood on the strip.  Discussed recent A1c level of 7.5%.  Pt's son also in room at time of visit; his questions were answered.  Diabetes Patient Education Guide provided to pt.    Education Provided:  How to test blood glucose using glucometer  Benefits of testing BG as directed - record results and report to MD      Patient verbalized understanding and was receptive to information provided.      Recommendations:  Please provide Rx to pt for a One Touch Verio glucose meter and supplies.  Attend outpatient diabetes education          Josselyn Galeana RD  Diabetes Educator  2024  3:32 PM

## 2024-07-07 LAB
ANION GAP SERPL CALC-SCNC: 8 MMOL/L (ref 0–18)
BASOPHILS # BLD AUTO: 0.03 X10(3) UL (ref 0–0.2)
BASOPHILS NFR BLD AUTO: 0.6 %
BUN BLD-MCNC: 12 MG/DL (ref 9–23)
BUN/CREAT SERPL: 19 (ref 10–20)
CALCIUM BLD-MCNC: 8.6 MG/DL (ref 8.7–10.4)
CHLORIDE SERPL-SCNC: 111 MMOL/L (ref 98–112)
CO2 SERPL-SCNC: 25 MMOL/L (ref 21–32)
CREAT BLD-MCNC: 0.63 MG/DL
DEPRECATED RDW RBC AUTO: 41.4 FL (ref 35.1–46.3)
EGFRCR SERPLBLD CKD-EPI 2021: 101 ML/MIN/1.73M2 (ref 60–?)
EOSINOPHIL # BLD AUTO: 0.11 X10(3) UL (ref 0–0.7)
EOSINOPHIL NFR BLD AUTO: 2.1 %
ERYTHROCYTE [DISTWIDTH] IN BLOOD BY AUTOMATED COUNT: 15.1 % (ref 11–15)
GLUCOSE BLD-MCNC: 134 MG/DL (ref 70–99)
GLUCOSE BLDC GLUCOMTR-MCNC: 100 MG/DL (ref 70–99)
GLUCOSE BLDC GLUCOMTR-MCNC: 127 MG/DL (ref 70–99)
GLUCOSE BLDC GLUCOMTR-MCNC: 138 MG/DL (ref 70–99)
GLUCOSE BLDC GLUCOMTR-MCNC: 139 MG/DL (ref 70–99)
HCT VFR BLD AUTO: 33.1 %
HGB BLD-MCNC: 10.2 G/DL
IMM GRANULOCYTES # BLD AUTO: 0.01 X10(3) UL (ref 0–1)
IMM GRANULOCYTES NFR BLD: 0.2 %
LYMPHOCYTES # BLD AUTO: 1.69 X10(3) UL (ref 1–4)
LYMPHOCYTES NFR BLD AUTO: 32.8 %
MAGNESIUM SERPL-MCNC: 1.5 MG/DL (ref 1.6–2.6)
MCH RBC QN AUTO: 23.4 PG (ref 26–34)
MCHC RBC AUTO-ENTMCNC: 30.8 G/DL (ref 31–37)
MCV RBC AUTO: 76.1 FL
MONOCYTES # BLD AUTO: 0.69 X10(3) UL (ref 0.1–1)
MONOCYTES NFR BLD AUTO: 13.4 %
NEUTROPHILS # BLD AUTO: 2.62 X10 (3) UL (ref 1.5–7.7)
NEUTROPHILS # BLD AUTO: 2.62 X10(3) UL (ref 1.5–7.7)
NEUTROPHILS NFR BLD AUTO: 50.9 %
OSMOLALITY SERPL CALC.SUM OF ELEC: 300 MOSM/KG (ref 275–295)
PHOSPHATE SERPL-MCNC: 3.4 MG/DL (ref 2.4–5.1)
PLATELET # BLD AUTO: 263 10(3)UL (ref 150–450)
POTASSIUM SERPL-SCNC: 3.7 MMOL/L (ref 3.5–5.1)
RBC # BLD AUTO: 4.35 X10(6)UL
SODIUM SERPL-SCNC: 144 MMOL/L (ref 136–145)
WBC # BLD AUTO: 5.2 X10(3) UL (ref 4–11)

## 2024-07-07 PROCEDURE — 99233 SBSQ HOSP IP/OBS HIGH 50: CPT | Performed by: HOSPITALIST

## 2024-07-07 RX ORDER — MAGNESIUM OXIDE 400 MG/1
800 TABLET ORAL ONCE
Status: COMPLETED | OUTPATIENT
Start: 2024-07-07 | End: 2024-07-07

## 2024-07-07 RX ORDER — ACETAMINOPHEN 500 MG
500 TABLET ORAL EVERY 4 HOURS PRN
Status: DISCONTINUED | OUTPATIENT
Start: 2024-07-07 | End: 2024-07-08

## 2024-07-07 RX ORDER — ALBUTEROL SULFATE 90 UG/1
1 AEROSOL, METERED RESPIRATORY (INHALATION) EVERY 4 HOURS PRN
Status: DISCONTINUED | OUTPATIENT
Start: 2024-07-07 | End: 2024-07-08

## 2024-07-07 RX ORDER — ECHINACEA PURPUREA EXTRACT 125 MG
1 TABLET ORAL
Status: DISCONTINUED | OUTPATIENT
Start: 2024-07-07 | End: 2024-07-08

## 2024-07-07 RX ORDER — ACETAMINOPHEN 500 MG
500 TABLET ORAL EVERY 4 HOURS PRN
Status: DISCONTINUED | OUTPATIENT
Start: 2024-07-07 | End: 2024-07-07

## 2024-07-07 RX ORDER — LISINOPRIL 10 MG/1
10 TABLET ORAL DAILY
Status: DISCONTINUED | OUTPATIENT
Start: 2024-07-07 | End: 2024-07-08

## 2024-07-07 RX ORDER — ALLOPURINOL 100 MG/1
100 TABLET ORAL DAILY
Status: DISCONTINUED | OUTPATIENT
Start: 2024-07-07 | End: 2024-07-08

## 2024-07-07 RX ORDER — HYDROCODONE BITARTRATE AND ACETAMINOPHEN 5; 325 MG/1; MG/1
1 TABLET ORAL EVERY 4 HOURS PRN
Status: DISCONTINUED | OUTPATIENT
Start: 2024-07-07 | End: 2024-07-08

## 2024-07-07 NOTE — PLAN OF CARE
Problem: Patient Centered Care  Goal: Patient preferences are identified and integrated in the patient's plan of care  Description: Interventions:  - What would you like us to know as we care for you?   - Provide timely, complete, and accurate information to patient/family  - Incorporate patient and family knowledge, values, beliefs, and cultural backgrounds into the planning and delivery of care  - Encourage patient/family to participate in care and decision-making at the level they choose  - Honor patient and family perspectives and choices  Outcome: Progressing     Problem: Diabetes/Glucose Control  Goal: Glucose maintained within prescribed range  Description: INTERVENTIONS:  - Monitor Blood Glucose as ordered  - Assess for signs and symptoms of hyperglycemia and hypoglycemia  - Administer ordered medications to maintain glucose within target range  - Assess barriers to adequate nutritional intake and initiate nutrition consult as needed  - Instruct patient on self management of diabetes  Outcome: Progressing     Problem: Patient/Family Goals  Goal: Patient/Family Long Term Goal  Description: Patient's Long Term Goal:     Interventions:  - See additional Care Plan goals for specific interventions  Outcome: Progressing  Goal: Patient/Family Short Term Goal  Description: Patient's Short Term Goal:     Interventions:   - See additional Care Plan goals for specific interventions  Outcome: Progressing     Problem: METABOLIC/FLUID AND ELECTROLYTES - ADULT  Goal: Glucose maintained within prescribed range  Description: INTERVENTIONS:  - Monitor Blood Glucose as ordered  - Assess for signs and symptoms of hyperglycemia and hypoglycemia  - Administer ordered medications to maintain glucose within target range  - Assess barriers to adequate nutritional intake and initiate nutrition consult as needed  - Instruct patient on self management of diabetes  Outcome: Progressing  Goal: Electrolytes maintained within normal  limits  Description: INTERVENTIONS:  - Monitor labs and rhythm and assess patient for signs and symptoms of electrolyte imbalances  - Administer electrolyte replacement as ordered  - Monitor response to electrolyte replacements, including rhythm and repeat lab results as appropriate  - Fluid restriction as ordered  - Instruct patient on fluid and nutrition restrictions as appropriate  Outcome: Progressing  Goal: Hemodynamic stability and optimal renal function maintained  Description: INTERVENTIONS:  - Monitor labs and assess for signs and symptoms of volume excess or deficit  - Monitor intake, output and patient weight  - Monitor urine specific gravity, serum osmolarity and serum sodium as indicated or ordered  - Monitor response to interventions for patient's volume status, including labs, urine output, blood pressure (other measures as available)  - Encourage oral intake as appropriate  - Instruct patient on fluid and nutrition restrictions as appropriate  Outcome: Progressing     Problem: SKIN/TISSUE INTEGRITY - ADULT  Goal: Skin integrity remains intact  Description: INTERVENTIONS  - Assess and document risk factors for pressure ulcer development  - Assess and document skin integrity  - Monitor for areas of redness and/or skin breakdown  - Initiate interventions, skin care algorithm/standards of care as needed  Outcome: Progressing  Scheduled medications given- see MAR, bed at lowest position, belongings and call light within reach. Frequent staff rounding.

## 2024-07-07 NOTE — PLAN OF CARE
Problem: Patient Centered Care  Goal: Patient preferences are identified and integrated in the patient's plan of care  Description: Interventions:  - What would you like us to know as we care for you?   - Provide timely, complete, and accurate information to patient/family  - Incorporate patient and family knowledge, values, beliefs, and cultural backgrounds into the planning and delivery of care  - Encourage patient/family to participate in care and decision-making at the level they choose  - Honor patient and family perspectives and choices  Outcome: Progressing     Problem: Diabetes/Glucose Control  Goal: Glucose maintained within prescribed range  Description: INTERVENTIONS:  - Monitor Blood Glucose as ordered  - Assess for signs and symptoms of hyperglycemia and hypoglycemia  - Administer ordered medications to maintain glucose within target range  - Assess barriers to adequate nutritional intake and initiate nutrition consult as needed  - Instruct patient on self management of diabetes  Outcome: Progressing     Problem: Patient/Family Goals  Goal: Patient/Family Long Term Goal  Description: Patient's Long Term Goal: to return home    Interventions:  - IV antibiotics  -monitor BP    - See additional Care Plan goals for specific interventions  Outcome: Progressing  Goal: Patient/Family Short Term Goal  Description: Patient's Short Term Goal: to feel better    Interventions:   - follow MD recommendations  - See additional Care Plan goals for specific interventions  Outcome: Progressing     Problem: METABOLIC/FLUID AND ELECTROLYTES - ADULT  Goal: Glucose maintained within prescribed range  Description: INTERVENTIONS:  - Monitor Blood Glucose as ordered  - Assess for signs and symptoms of hyperglycemia and hypoglycemia  - Administer ordered medications to maintain glucose within target range  - Assess barriers to adequate nutritional intake and initiate nutrition consult as needed  - Instruct patient on self  management of diabetes  Outcome: Progressing  Goal: Electrolytes maintained within normal limits  Description: INTERVENTIONS:  - Monitor labs and rhythm and assess patient for signs and symptoms of electrolyte imbalances  - Administer electrolyte replacement as ordered  - Monitor response to electrolyte replacements, including rhythm and repeat lab results as appropriate  - Fluid restriction as ordered  - Instruct patient on fluid and nutrition restrictions as appropriate  Outcome: Progressing  Goal: Hemodynamic stability and optimal renal function maintained  Description: INTERVENTIONS:  - Monitor labs and assess for signs and symptoms of volume excess or deficit  - Monitor intake, output and patient weight  - Monitor urine specific gravity, serum osmolarity and serum sodium as indicated or ordered  - Monitor response to interventions for patient's volume status, including labs, urine output, blood pressure (other measures as available)  - Encourage oral intake as appropriate  - Instruct patient on fluid and nutrition restrictions as appropriate  Outcome: Progressing     Problem: SKIN/TISSUE INTEGRITY - ADULT  Goal: Skin integrity remains intact  Description: INTERVENTIONS  - Assess and document risk factors for pressure ulcer development  - Assess and document skin integrity  - Monitor for areas of redness and/or skin breakdown  - Initiate interventions, skin care algorithm/standards of care as needed  Outcome: Progressing

## 2024-07-07 NOTE — PROGRESS NOTES
[facility]  part of [facility]    Progress Note    Juan Manuel Romero Patient Status:  Inpatient    1965 MRN [id number]   Location [facility] 5SW/SE Attending Ceci Menjivar,*   Hosp Day # 2 PCP Shahid Harrell     Chief Complaint: headache    Subjective:     Constitutional:  Positive for fatigue. Negative for appetite change and chills.   HENT:  Negative for congestion.    Respiratory:  Negative for choking, chest tightness and shortness of breath.    Cardiovascular:  Negative for leg swelling.   Gastrointestinal:  Negative for abdominal pain.   Genitourinary:  Negative for dysuria.   Neurological:  Positive for headaches.   Psychiatric/Behavioral:  Negative for hallucinations, confusion and decreased concentration. The patient is not nervous/anxious and is not hyperactive.        Objective:   Blood pressure (!) 166/94, pulse 81, temperature 98.4 °F (36.9 °C), temperature source Oral, resp. rate 18, height 5' 2\" (1.575 m), weight 268 lb 6.4 oz (121.7 kg), SpO2 97%.  Physical Exam  Constitutional:       General: She is not in acute distress.     Appearance: She is obese. She is not ill-appearing, toxic-appearing or diaphoretic.   HENT:      Head: Normocephalic and atraumatic.   Cardiovascular:      Rate and Rhythm: Normal rate and regular rhythm.      Pulses: Normal pulses.   Pulmonary:      Effort: Pulmonary effort is normal.   Abdominal:      General: Bowel sounds are normal.      Palpations: Abdomen is soft.   Skin:     General: Skin is warm and dry.      Capillary Refill: Capillary refill takes less than 2 seconds.   Neurological:      General: No focal deficit present.      Mental Status: She is alert.   Psychiatric:         Behavior: Behavior normal.         Judgment: Judgment normal.         Results:   Lab Results   Component Value Date    WBC 5.2 2024    HGB 10.2 (L) 2024    HCT 33.1 (L) 2024    .0 2024    CREATSERUM 0.63 2024     BUN 12 07/07/2024     07/07/2024    K 3.7 07/07/2024     07/07/2024    CO2 25.0 07/07/2024     (H) 07/07/2024    CA 8.6 (L) 07/07/2024    ALB 4.1 07/04/2024    ALKPHO 93 07/04/2024    BILT 0.4 07/04/2024    TP 6.9 07/04/2024    AST 20 07/04/2024    ALT 18 07/04/2024     10/30/2021    MG 1.5 (L) 07/07/2024    PHOS 3.4 07/07/2024     10/30/2021       CT ABDOMEN+PELVIS KIDNEYSTONE 2D RNDR(NO IV,NO ORAL)(CPT=74176)    Result Date: 7/5/2024  CONCLUSION:  1. No evidence of nephroureterolithiasis or obstructive uropathy.  2. Bladder wall thickening with perivesical fat stranding may be indicative underlying cystitis.  3. Nonspecific right-sided perinephric/periureteral fat stranding, which could reflect ascending urinary tract infection in the appropriate clinical setting. Please note that pyelonephritis is a largely clinical diagnosis, and imaging studies have a limited role and clinical utility for this particular indication.  4. Cholelithiasis without CT evidence acute complication.  5. Uncomplicated distal colonic diverticulosis.  6. Hepatosplenomegaly.  7. Left adrenal adenoma.  8. Status post previous gastric bypass.  9. Low-density appearance of the intracardiac blood pool raises the possibility of underlying anemia. Correlate with hematologic parameters.  10. Lesser incidental findings as above.    Dictated by (CST): Jarocho Nayak MD on 7/05/2024 at 3:59 PM     Finalized by (CST): Jarocho Nayak MD on 7/05/2024 at 4:07 PM               Assessment & Plan:     1.       Sepsis Urinary tract infection probable pyelo, with gram-negative gabe bacteremia.  1 day repeat bc; looks well; ecoli sensi to ancef; dw pt poss causes  2.       Morbid obesity. Bmi=49.09 needs vale smith  3.       Diabetes mellitus type 2.   4.       Hypertension.  5.       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        Hepatosplenomegaly fu primary; pt denies alcohol use  7.        Adrenal adenoma fu as  outpt; copies of ct report gven to pt    Complex mdm; coordinating care with nurse and counseling pt about uti/sepsis/hepatosplenomegaly/adenoma; ct report with yellow highlighter outlined concerns given to pt to show primary            AVTAR FORREST MD

## 2024-07-08 VITALS
DIASTOLIC BLOOD PRESSURE: 62 MMHG | HEIGHT: 62 IN | RESPIRATION RATE: 18 BRPM | OXYGEN SATURATION: 97 % | BODY MASS INDEX: 49.39 KG/M2 | HEART RATE: 74 BPM | TEMPERATURE: 98 F | SYSTOLIC BLOOD PRESSURE: 120 MMHG | WEIGHT: 268.38 LBS

## 2024-07-08 PROBLEM — N30.00 ACUTE CYSTITIS WITHOUT HEMATURIA: Status: RESOLVED | Noted: 2024-07-05 | Resolved: 2024-07-08

## 2024-07-08 PROBLEM — R78.81 BACTEREMIA: Status: RESOLVED | Noted: 2024-07-05 | Resolved: 2024-07-08

## 2024-07-08 LAB
ANION GAP SERPL CALC-SCNC: 7 MMOL/L (ref 0–18)
BASOPHILS # BLD AUTO: 0.04 X10(3) UL (ref 0–0.2)
BASOPHILS NFR BLD AUTO: 0.8 %
BUN BLD-MCNC: 14 MG/DL (ref 9–23)
BUN/CREAT SERPL: 20.3 (ref 10–20)
CALCIUM BLD-MCNC: 8.8 MG/DL (ref 8.7–10.4)
CHLORIDE SERPL-SCNC: 110 MMOL/L (ref 98–112)
CO2 SERPL-SCNC: 27 MMOL/L (ref 21–32)
CREAT BLD-MCNC: 0.69 MG/DL
DEPRECATED RDW RBC AUTO: 40 FL (ref 35.1–46.3)
EGFRCR SERPLBLD CKD-EPI 2021: 99 ML/MIN/1.73M2 (ref 60–?)
EOSINOPHIL # BLD AUTO: 0.18 X10(3) UL (ref 0–0.7)
EOSINOPHIL NFR BLD AUTO: 3.7 %
ERYTHROCYTE [DISTWIDTH] IN BLOOD BY AUTOMATED COUNT: 14.9 % (ref 11–15)
GLUCOSE BLD-MCNC: 127 MG/DL (ref 70–99)
GLUCOSE BLDC GLUCOMTR-MCNC: 146 MG/DL (ref 70–99)
HCT VFR BLD AUTO: 33.6 %
HGB BLD-MCNC: 10.7 G/DL
IMM GRANULOCYTES # BLD AUTO: 0.01 X10(3) UL (ref 0–1)
IMM GRANULOCYTES NFR BLD: 0.2 %
LYMPHOCYTES # BLD AUTO: 1.95 X10(3) UL (ref 1–4)
LYMPHOCYTES NFR BLD AUTO: 40.5 %
MAGNESIUM SERPL-MCNC: 1.6 MG/DL (ref 1.6–2.6)
MCH RBC QN AUTO: 23.8 PG (ref 26–34)
MCHC RBC AUTO-ENTMCNC: 31.8 G/DL (ref 31–37)
MCV RBC AUTO: 74.7 FL
MONOCYTES # BLD AUTO: 0.52 X10(3) UL (ref 0.1–1)
MONOCYTES NFR BLD AUTO: 10.8 %
NEUTROPHILS # BLD AUTO: 2.12 X10 (3) UL (ref 1.5–7.7)
NEUTROPHILS # BLD AUTO: 2.12 X10(3) UL (ref 1.5–7.7)
NEUTROPHILS NFR BLD AUTO: 44 %
OSMOLALITY SERPL CALC.SUM OF ELEC: 300 MOSM/KG (ref 275–295)
PHOSPHATE SERPL-MCNC: 4.5 MG/DL (ref 2.4–5.1)
PLATELET # BLD AUTO: 255 10(3)UL (ref 150–450)
POTASSIUM SERPL-SCNC: 4 MMOL/L (ref 3.5–5.1)
RBC # BLD AUTO: 4.5 X10(6)UL
SODIUM SERPL-SCNC: 144 MMOL/L (ref 136–145)
WBC # BLD AUTO: 4.8 X10(3) UL (ref 4–11)

## 2024-07-08 PROCEDURE — 99239 HOSP IP/OBS DSCHRG MGMT >30: CPT | Performed by: INTERNAL MEDICINE

## 2024-07-08 RX ORDER — CEPHALEXIN 500 MG/1
500 CAPSULE ORAL 2 TIMES DAILY
Qty: 20 CAPSULE | Refills: 0 | Status: SHIPPED | OUTPATIENT
Start: 2024-07-08 | End: 2024-07-18

## 2024-07-08 NOTE — DISCHARGE SUMMARY
[facility]  part of [facility]    Discharge Summary    Juan Manuel Romero Patient Status:  Inpatient    1965 MRN [id number]   Location [facility] 5SW/SE Attending Mario Arreguin MD   Hosp Day # 3 PCP Shahid Harrell     Date of Admission: 2024 Disposition: Home or Self Care     Date of Discharge: 2024    Admitting Diagnosis: Acute cystitis without hematuria [N30.00]    Hospital Discharge Diagnoses:   E coli Sepsis  UTI  Pyelonephritis  Morbid Obesity (BMI 49)  DM type 2  Hypertension  Heaptosplenomegaly  Adrenal Adenoma    Lace+ Score: 32  59-90 High Risk  29-58 Medium Risk  0-28   Low Risk.    TCM Follow-Up Recommendation:  LACE 29-58: Moderate Risk of readmission after discharge from the hospital.      Problem List:   Patient Active Problem List   Diagnosis   (none) - all problems resolved or deleted       Reason for Admission: UTI    Physical Exam:   General appearance: alert, appears stated age and cooperative  Pulmonary:  clear to auscultation bilaterally  Cardiovascular: S1, S2 normal, no murmur, click, rub or gallop, regular rate and rhythm  Abdominal: soft, non-tender; bowel sounds normal; no masses,  no organomegaly  Extremities: extremities normal, atraumatic, no cyanosis or edema  Psychiatric: calm      History of Present Illness: Patient is a 58-year-old  female who was seen yesterday in the emergency department for evaluation of urinary tract infection symptoms and lower back/lower pelvic pain. She was diagnosed with urinary tract infection and discharged home on antibiotics. Today, she was called back for positive urine culture for E coli and positive blood cultures for gram-negative rods. Repeat CBC and culture, urinalysis still pending. CT scan of the abdomen ordered, rule out kidney stones. Started on IV Rocephin, and she will be admitted to the hospital for further management.     Hospital Course:   Sepsis Urinary tract infection probable  pyelo, with gram-negative gabe bacteremia.  1 day repeat bc; looks well; ecoli sensi to ancef; dw pt poss causes  Morbid obesity. Bmi=49.09 needs vale smith  Diabetes mellitus type 2.   Hypertension.  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   Refills: 0            CHANGE how you take these medications        Instructions Prescription details   cephalexin 500 MG Caps  Commonly known as: Keflex  What changed: Another medication with the same name was removed. Continue taking this medication, and follow the directions you see here.      Take 1 capsule (500 mg total) by mouth 2 (two) times daily for 10 days.   Stop taking on: July 18, 2024  Quantity: 20 capsule  Refills: 0            CONTINUE taking these medications        Instructions Prescription details   albuterol 108 (90 Base) MCG/ACT Aers  Commonly known as: Ventolin HFA      Inhale 1 puff into the lungs every 4 (four) hours as needed for Shortness of Breath or Wheezing.   Refills: 0     allopurinol 100 MG Tabs  Commonly known as: Zyloprim      Take 1 tablet (100 mg total) by mouth daily.   Refills: 0     metFORMIN HCl 1000 MG Tabs  Commonly known as: GLUCOPHAGE      Take 0.5 tablets (500 mg total) by mouth 2 (two) times daily with meals.   Refills: 0               Where to Get Your Medications        These medications were sent to [id number] - [place], [place] - [address] [place] [address] RD AT [facility], [phone], [phone]  [address] [place] [address], [facility] [address]      Phone: [phone]   cephalexin 500 MG  Caps         Follow up Visits: Follow-up with in 1 month    Follow up Labs: none     Other Discharge Instructions: none    Mario Arreguin MD  7/8/2024  8:52 AM    > 35 min

## 2024-07-08 NOTE — PAYOR COMM NOTE
7/5 THRU 7/7  ADMISSION REVIEW     Payor: [facility]  Subscriber #:  [id number]  Authorization Number: [id number]    Admit date: 7/5/24  Admit time:  6:11 PM       REVIEW DOCUMENTATION:     ED Provider Notes        ED Provider Notes signed by Jennifer Coombs MD at 7/5/2024  4:29 PM       Author: Jennifer Coombs MD Service: -- Author Type: Physician    Filed: 7/5/2024  4:29 PM Date of Service: 7/5/2024  2:02 PM Status: Signed    : Jennifer Coombs MD (Physician)           Patient Seen in: [facility] Emergency Department      History     Chief Complaint   Patient presents with    Abnormal Result     Stated Complaint: + blood cultures    Subjective:   HPI  58 yoF with hypertension, gout, diabetes who presents for evaluation of positive blood cultures.  She was seen in the ER yesterday and diagnosed with UTI.  She received Rocephin in the hospital.  She was not able to  the Keflex from the pharmacy and has not had any antibiotics since leaving the hospital yesterday.  Today she has fatigue, lightheadedness and headaches.  She has dysuria.  She was called to return to the ED for positive blood cultures that were drawn yesterday.      Objective:   Past Medical History:    Asthma (HCC)    Diabetes (HCC)    Essential hypertension    Gout              History reviewed. No pertinent surgical history.             Social History     Socioeconomic History    Marital status:    Tobacco Use    Smoking status: Never    Smokeless tobacco: Never   Substance and Sexual Activity    Alcohol use: Not Currently    Drug use: Not Currently     Social Determinants of Health      Received from [facility]              Review of Systems    Positive for stated Chief Complaint: Abnormal Result    Other systems are as noted in HPI.  Constitutional and vital signs reviewed.      All other systems reviewed and negative except as noted above.    Physical Exam     ED Triage Vitals  [07/05/24 1307]   /89   Pulse 75   Resp 18   Temp 98.2 °F (36.8 °C)   Temp src Oral   SpO2 98 %   O2 Device None (Room air)       Current Vitals:   Vital Signs  BP: 129/79  Pulse: 71  Resp: 18  Temp: 98.2 °F (36.8 °C)  Temp src: Oral  MAP (mmHg): 96    Oxygen Therapy  SpO2: 97 %  O2 Device: None (Room air)            Physical Exam  Vitals and nursing note reviewed.   Constitutional:       Appearance: She is well-developed.   HENT:      Head: Normocephalic and atraumatic.      Mouth/Throat:      Mouth: Mucous membranes are moist.      Pharynx: Oropharynx is clear.   Eyes:      Extraocular Movements: Extraocular movements intact.      Pupils: Pupils are equal, round, and reactive to light.   Cardiovascular:      Rate and Rhythm: Normal rate and regular rhythm.      Heart sounds: Normal heart sounds.   Pulmonary:      Effort: Pulmonary effort is normal.      Breath sounds: Normal breath sounds.   Abdominal:      General: There is no distension.      Palpations: Abdomen is soft.      Tenderness: There is no abdominal tenderness. There is no right CVA tenderness or left CVA tenderness.   Musculoskeletal:         General: Normal range of motion.      Cervical back: Normal range of motion and neck supple. No tenderness.   Skin:     General: Skin is warm.      Capillary Refill: Capillary refill takes less than 2 seconds.   Neurological:      Mental Status: She is alert.      Comments: No focal deficits       Differential diagnosis includes but is not limited to cystitis, pyelonephritis, bacteremia, contamination        ED Course     Labs Reviewed   BASIC METABOLIC PANEL (8) - Abnormal; Notable for the following components:       Result Value    Glucose 121 (*)     Calculated Osmolality 296 (*)     All other components within normal limits   CBC W/ DIFFERENTIAL - Abnormal; Notable for the following components:    HGB 11.0 (*)     HCT 34.6 (*)     MCV 74.7 (*)     MCH 23.8 (*)     RDW 15.4 (*)     All other components  within normal limits   LACTIC ACID, PLASMA - Normal   CBC WITH DIFFERENTIAL WITH PLATELET    Narrative:     The following orders were created for panel order CBC With Differential With Platelet.  Procedure                               Abnormality         Status                     ---------                               -----------         ------                     CBC W/ DIFFERENTIAL[[id number]]          Abnormal            Final result                 Please view results for these tests on the individual orders.   BLOOD CULTURE   BLOOD CULTURE        CT ABDOMEN+PELVIS KIDNEYSTONE 2D RNDR(NO IV,NO ORAL)(CPT=74176)    Result Date: 7/5/2024  CONCLUSION:  1. No evidence of nephroureterolithiasis or obstructive uropathy.  2. Bladder wall thickening with perivesical fat stranding may be indicative underlying cystitis.  3. Nonspecific right-sided perinephric/periureteral fat stranding, which could reflect ascending urinary tract infection in the appropriate clinical setting. Please note that pyelonephritis is a largely clinical diagnosis, and imaging studies have a limited role and clinical utility for this particular indication.  4. Cholelithiasis without CT evidence acute complication.  5. Uncomplicated distal colonic diverticulosis.  6. Hepatosplenomegaly.  7. Left adrenal adenoma.  8. Status post previous gastric bypass.  9. Low-density appearance of the intracardiac blood pool raises the possibility of underlying anemia. Correlate with hematologic parameters.  10. Lesser incidental findings as above.    Dictated by (CST): Jarocho Nayak MD on 7/05/2024 at 3:59 PM     Finalized by (CST): Jarocho Nayak MD on 7/05/2024 at 4:07 PM                  MDM             Medical Decision Making  Vitals are stable in the ED.  BMP with normal renal function.  Lactic acid normal.  CBC without leukocytosis.  Per my independent interpretation of CT ab pelvis there is no obstructing ureteral stone.  She will be treated with  Rocephin. Blood cultures repeated today.    D/w Dr. Shetty for ID consultation-  agrees with mary.  D/w Dr. Nava for admission.        Problems Addressed:  Acute cystitis without hematuria: complicated acute illness or injury with systemic symptoms that poses a threat to life or bodily functions    Amount and/or Complexity of Data Reviewed  External Data Reviewed: labs.     Details:   Reviewed urine culture from 7/4/2024 which showed greater than 100,000 CFU of E. coli, sensitivities pending.  Reviewed 7/4/24 blood culture 1 of 2 sets positive for gram-negative rods.    Labs: ordered. Decision-making details documented in ED Course.  Discussion of management or test interpretation with external provider(s): As above    Risk  Decision regarding hospitalization.        Disposition and Plan     Clinical Impression:  1. Acute cystitis without hematuria         Disposition:  Admit  7/5/2024  4:17 pm    Follow-up:  No follow-up provider specified.        Medications Prescribed:  Current Discharge Medication List                            Hospital Problems       Present on Admission             ICD-10-CM Noted POA    * (Principal) Acute cystitis without hematuria N30.00 7/5/2024 Unknown                     Signed by Jennifer Coombs MD on 7/5/2024  4:29 PM         MEDICATIONS ADMINISTERED IN LAST 1 DAY:  allopurinol (Zyloprim) tab 100 mg       Date Action Dose Route User    Discharged on 7/8/2024 7/8/2024 0928 Given 100 mg Oral Augusta Arce RN          ceFAZolin (Ancef) 2g in 10mL IV syringe premix       Date Action Dose Route User    Discharged on 7/8/2024 7/8/2024 0930 New Bag 2 g Intravenous Augusta Arce RN    7/8/2024 0210 New Bag 2 g Intravenous Kassy aKn RN    7/7/2024 1737 New Bag 2 g Intravenous Thi Snowden RN          enoxaparin (Lovenox) 60 MG/0.6ML SUBQ injection 60 mg       Date Action Dose Route User    Discharged on 7/8/2024 7/7/2024 2053 Given 60 mg Subcutaneous (Right  Lower Abdomen) Kassy Kan RN          lisinopril (Zestril) tab 10 mg       Date Action Dose Route User    Discharged on 7/8/2024 7/8/2024 0928 Given 10 mg Oral Augusta Arce RN            Vitals (last day) before discharge       Date/Time Temp Pulse Resp BP SpO2 Weight O2 Device O2 Flow Rate (L/min) [facility]    07/08/24 0921 98.4 °F (36.9 °C) 74 18 120/62 97 % -- None (Room air) -- GP    07/08/24 0210 97.8 °F (36.6 °C) 72 18 128/90 96 % -- None (Room air) -- EM    07/07/24 2050 98 °F (36.7 °C) 79 18 122/70 97 % -- -- -- EM    07/07/24 1737 -- 76 -- 133/69 -- -- -- -- CE    07/07/24 1735 98.4 °F (36.9 °C) 77 18 170/90 97 % -- None (Room air) -- CE    07/07/24 0842 98.4 °F (36.9 °C) 81 18 166/94 97 % -- None (Room air) -- CE    07/07/24 0405 99 °F (37.2 °C) 76 18 154/81 98 % -- None (Room air) -- EM     7/5 H&P  HISTORY AND PHYSICAL EXAMINATION     CHIEF COMPLAINT:  Gram-negative gabe bacteremia.      HISTORY OF PRESENT ILLNESS:  Patient is a 58-year-old  female who was seen yesterday in the emergency department for evaluation of urinary tract infection symptoms and lower back/lower pelvic pain.  She was diagnosed with urinary tract infection and discharged home on antibiotics.  Today, she was called back for positive urine culture for E coli and positive blood cultures for gram-negative rods.  Repeat CBC and culture, urinalysis still pending.  CT scan of the abdomen ordered, rule out kidney stones.  Started on IV Rocephin, and she will be admitted to the hospital for further management.        ASSESSMENT:    1.       Urinary tract infection, possible right pyelonephritis, rule out kidney stone, with gram-negative gabe bacteremia.    2.       Morbid obesity.  3.       Diabetes mellitus type 2.   4.       Hypertension.     PLAN:  Patient will be admitted to general medical floor.  IV Rocephin.  IV fluids.   Follow up on imaging studies of the abdomen.  Rule out kidney stones.  Obtain ID consult.   Monitor Accu-Cheks.  Further recommendations to follow.      7/6 ID CONSULT NOTE    Reason for Consultation:  E.coli bacteremia     ASSESSMENT:     Antibiotics: Ceftriaxone     # E.coli UTI w/bacteremia  # Headache  # DM, HTN     PLAN:     -  Continue Ceftriaxone   -  FU repeat bcx  -  Follow fever curve, wbc  -  Reviewed labs, micro, imaging reports, available old records  -  D/w patient, family, RN     History of Present Illness:  Juan Manuel Romero is a a(n) 58 year old female with hx of DM, HTN, gout. Pt seen in ED 7/4 with dysuria, urinary urgency and frequency for past few days. She was dx with UTI by PCP and prescribed abx but unable to fill it. Her sx progressed with associated myalgias and chills but no fever. Also with back pain w/o incontinence,retention or paraesthesia. No abdom pain, n/v/d.At that time wbc 7.6. LA 2.7. UA >50wbc. Cr 1.06. Pt felt better s/p CTX dose and DC with keflex rx. Ucx returned with E.coli and Bcx also pos for E.col (1/2) and thus pt returns to ED 7/5 per guidance for further mgmt. Pt had not yet filled Keflex prescription. Pt continues to c/o dysuria. Also with fatigue and headaches.On admit, tm 99.2. WBC 6.4. Repeat bcx pending. CT A/P w/o stones hydronephrosis or obstructive uropathy. Noted with bladder wall thickening and pervesical fat stranding, nonspecific R perinephric fat stranding. Started on ctx. ID consulted.      7/6 HOSPITALIST NOTE    Objective:  Blood pressure 136/84, pulse 82, temperature 99.2 °F (37.3 °C), temperature source Oral, resp. rate 18, height 5' 2\" (1.575 m), weight 268 lb 6.4 oz (121.7 kg), SpO2 97%         Assessment & Plan:  1.       Sepsis Urinary tract infection probable pyelo, with gram-negative gabe bacteremia.  Await repeat bc; looks well  2.       Morbid obesity. Bmi=49.09 needs vale smith  3.       Diabetes mellitus type 2.   4.       Hypertension.     Complex mdm; coordinating care with nurse and counseling pt and with her permission her family in  room about uti/sepsis     7/7 HOSPITALIST NOTE  Chief Complaint: headache        Subjective:     Constitutional:  Positive for fatigue. Negative for appetite change and chills.   HENT:  Negative for congestion.    Respiratory:  Negative for choking, chest tightness and shortness of breath.    Cardiovascular:  Negative for leg swelling.   Gastrointestinal:  Negative for abdominal pain.   Genitourinary:  Negative for dysuria.   Neurological:  Positive for headaches.     Blood pressure (!) 166/94, pulse 81, temperature 98.4 °F (36.9 °C), temperature source Oral, resp. rate 18, height 5' 2\" (1.575 m), weight 268 lb 6.4 oz (121.7 kg), SpO2 97%.     Results:        Lab Results   Component Value Date     WBC 5.2 07/07/2024     HGB 10.2 (L) 07/07/2024     HCT 33.1 (L) 07/07/2024     .0 07/07/2024     CREATSERUM 0.63 07/07/2024     BUN 12 07/07/2024      07/07/2024     K 3.7 07/07/2024      07/07/2024     CO2 25.0 07/07/2024      (H) 07/07/2024     CA 8.6 (L) 07/07/2024       MG 1.5 (L) 07/07/2024      PHOS 3.4 07/07/2024          Assessment & Plan:  1.       Sepsis Urinary tract infection probable pyelo, with gram-negative gabe bacteremia.  1 day repeat bc; looks well; ecoli sensi to ancef; dw pt poss causes  2.       Morbid obesity. Bmi=49.09 needs vale smith  3.       Diabetes mellitus type 2.   4.       Hypertension.  5.       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        Hepatosplenomegaly fu primary; pt denies alcohol use  7.        Adrenal adenoma fu as outpt; copies of ct report gven to pt     Complex mdm; coordinating care with nurse and counseling pt about uti/sepsis/hepatosplenomegaly/adenoma; ct report with yellow highlighter outlined concerns given to pt to show primary

## 2024-07-08 NOTE — PAYOR COMM NOTE
--------------  DISCHARGE REVIEW    Payor: [facility]  Subscriber #:  [id number]  Authorization Number: [id number]    Admit date: 24  Admit time:   6:11 PM  Discharge Date: 2024 11:40 AM     Admitting Physician:   Attending Physician:  Areli att. providers found  Primary Care Physician: Shahid Harrell          Discharge Summary Notes        Discharge Summary signed by Mario Arreguin MD at 2024  8:55 AM       Author: Mario Arreguin MD Specialty: HOSPITALIST Author Type: Physician    Filed: 2024  8:55 AM Date of Service: 2024  8:52 AM Status: Signed    : Mario Arreguin MD (Physician)         [facility]  [facility] of [facility]    Discharge Summary    Juan Manuel Romero Patient Status:  Inpatient    1965 MRN [id number]   Location [facility] 5SW/SE Attending Mario Arreguin MD   Hosp Day # 3 PCP Shahid Harrell     Date of Admission: 2024 Disposition: Home or Self Care     Date of Discharge: 2024    Admitting Diagnosis: Acute cystitis without hematuria [N30.00]    Hospital Discharge Diagnoses:   E coli Sepsis  UTI  Pyelonephritis  Morbid Obesity (BMI 49)  DM type 2  Hypertension  Heaptosplenomegaly  Adrenal Adenoma    Lace+ Score: 32  59-90 High Risk  29-58 Medium Risk  0-28   Low Risk.    TCM Follow-Up Recommendation:  LACE 29-58: Moderate Risk of readmission after discharge from the hospital.      Problem List:   Patient Active Problem List   Diagnosis   (none) - all problems resolved or deleted       Reason for Admission: UTI    Physical Exam:   General appearance: alert, appears stated age and cooperative  Pulmonary:  clear to auscultation bilaterally  Cardiovascular: S1, S2 normal, no murmur, click, rub or gallop, regular rate and rhythm  Abdominal: soft, non-tender; bowel sounds normal; no masses,  no organomegaly  Extremities: extremities normal, atraumatic, no cyanosis or edema  Psychiatric:  calm      History of Present Illness: Patient is a 58-year-old  female who was seen yesterday in the emergency department for evaluation of urinary tract infection symptoms and lower back/lower pelvic pain. She was diagnosed with urinary tract infection and discharged home on antibiotics. Today, she was called back for positive urine culture for E coli and positive blood cultures for gram-negative rods. Repeat CBC and culture, urinalysis still pending. CT scan of the abdomen ordered, rule out kidney stones. Started on IV Rocephin, and she will be admitted to the hospital for further management.     Hospital Course:   Sepsis Urinary tract infection probable pyelo, with gram-negative gabe bacteremia.  1 day repeat bc; looks well; ecoli sensi to ancef; dw pt poss causes  Morbid obesity. Bmi=49.09 needs vale smith  Diabetes mellitus type 2.   Hypertension.  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   Refills: 0            CHANGE how you take these medications        Instructions Prescription details   cephalexin 500 MG Caps  Commonly known as: Keflex  What changed: Another medication with the same name was removed. Continue taking this medication, and follow the directions you see here.      Take 1 capsule (500 mg total) by mouth 2 (two) times daily for 10 days.   Stop taking on: July 18, 2024  Quantity: 20 capsule  Refills: 0            CONTINUE taking these medications        Instructions Prescription details   albuterol 108 (90 Base) MCG/ACT Aers  Commonly known as: Ventolin HFA      Inhale 1  puff into the lungs every 4 (four) hours as needed for Shortness of Breath or Wheezing.   Refills: 0     allopurinol 100 MG Tabs  Commonly known as: Zyloprim      Take 1 tablet (100 mg total) by mouth daily.   Refills: 0     metFORMIN HCl 1000 MG Tabs  Commonly known as: GLUCOPHAGE      Take 0.5 tablets (500 mg total) by mouth 2 (two) times daily with meals.   Refills: 0               Where to Get Your Medications        These medications were sent to [id number] - [place], [place] [facility] [address] [facility], [phone], [phone]  [address] [place] [address], [facility] [address]      Phone: [phone]   cephalexin 500 MG Caps         Follow up Visits: Follow-up with in 1 month    Follow up Labs: none     Other Discharge Instructions: none    Mario Arreguin MD  7/8/2024  8:52 AM    > 35 min     Electronically signed by Mario Arreguin MD on 7/8/2024  8:55 AM         REVIEWER COMMENTS

## 2024-07-08 NOTE — PLAN OF CARE
Patient stable for discharge; left with volunteer transport. IV removed. Discharge instructions/med list/follow up provided. Discharge paperwork taken by patient. Verbalized understanding.     Problem: Patient Centered Care  Goal: Patient preferences are identified and integrated in the patient's plan of care  Description: Interventions:  - What would you like us to know as we care for you?   - Provide timely, complete, and accurate information to patient/family  - Incorporate patient and family knowledge, values, beliefs, and cultural backgrounds into the planning and delivery of care  - Encourage patient/family to participate in care and decision-making at the level they choose  - Honor patient and family perspectives and choices     Problem: Diabetes/Glucose Control  Goal: Glucose maintained within prescribed range  Description: INTERVENTIONS:  - Monitor Blood Glucose as ordered  - Assess for signs and symptoms of hyperglycemia and hypoglycemia  - Administer ordered medications to maintain glucose within target range  - Assess barriers to adequate nutritional intake and initiate nutrition consult as needed  - Instruct patient on self management of diabetes      Problem: METABOLIC/FLUID AND ELECTROLYTES - ADULT  Goal: Glucose maintained within prescribed range  Description: INTERVENTIONS:  - Monitor Blood Glucose as ordered  - Assess for signs and symptoms of hyperglycemia and hypoglycemia  - Administer ordered medications to maintain glucose within target range  - Assess barriers to adequate nutritional intake and initiate nutrition consult as needed  - Instruct patient on self management of diabetes    Goal: Electrolytes maintained within normal limits  Description: INTERVENTIONS:  - Monitor labs and rhythm and assess patient for signs and symptoms of electrolyte imbalances  - Administer electrolyte replacement as ordered  - Monitor response to electrolyte replacements, including rhythm and repeat lab results as  appropriate  - Fluid restriction as ordered  - Instruct patient on fluid and nutrition restrictions as appropriate    Goal: Hemodynamic stability and optimal renal function maintained  Description: INTERVENTIONS:  - Monitor labs and assess for signs and symptoms of volume excess or deficit  - Monitor intake, output and patient weight  - Monitor urine specific gravity, serum osmolarity and serum sodium as indicated or ordered  - Monitor response to interventions for patient's volume status, including labs, urine output, blood pressure (other measures as available)  - Encourage oral intake as appropriate  - Instruct patient on fluid and nutrition restrictions as appropriate       Problem: SKIN/TISSUE INTEGRITY - ADULT  Goal: Skin integrity remains intact  Description: INTERVENTIONS  - Assess and document risk factors for pressure ulcer development  - Assess and document skin integrity  - Monitor for areas of redness and/or skin breakdown  - Initiate interventions, skin care algorithm/standards of care as needed

## 2024-07-10 LAB — BACTERIA BLD CULT: POSITIVE

## 2024-09-16 ENCOUNTER — OFFICE VISIT (OUTPATIENT)
Dept: SURGERY | Facility: CLINIC | Age: 59
End: 2024-09-16

## 2024-09-16 ENCOUNTER — TELEPHONE (OUTPATIENT)
Dept: SURGERY | Facility: CLINIC | Age: 59
End: 2024-09-16

## 2024-09-16 VITALS
SYSTOLIC BLOOD PRESSURE: 131 MMHG | BODY MASS INDEX: 47.84 KG/M2 | HEIGHT: 62 IN | HEART RATE: 84 BPM | DIASTOLIC BLOOD PRESSURE: 85 MMHG | WEIGHT: 260 LBS

## 2024-09-16 DIAGNOSIS — R80.8 OTHER PROTEINURIA: ICD-10-CM

## 2024-09-16 DIAGNOSIS — N39.0 CHRONIC UTI: Primary | ICD-10-CM

## 2024-09-16 PROCEDURE — 99204 OFFICE O/P NEW MOD 45 MIN: CPT | Performed by: PHYSICIAN ASSISTANT

## 2024-09-16 RX ORDER — ATORVASTATIN CALCIUM 20 MG/1
TABLET, FILM COATED ORAL
COMMUNITY
Start: 2024-09-10

## 2024-09-16 NOTE — PROGRESS NOTES
[facility]  Initial Office Consultation    HPI:   Juan Manuel Romero is a 58 year old female here today for consultation at the request Dr. Ward,  after recent hospitalization for acute cystitis. Patient was recently admitted to the hospital in July July 5, 2024 to July 8, 2024 with E. coli sepsis, UTI, pyelonephritis.  Urine culture showed greater than 100,000 CFU/mL E. coli, resistant to ampicillin, ampicillin plus sulbactam, Zosyn.  She reports that she has been struggling with urinary tract infections for the last two months. She reports that they have seen blood in her urine. She has never seen blood in her urine.   Urine culture was positive and was treated with UTI.   So far she has had three positive urinary tract infections.   When she gets UTIs she gets fatigue, get dizzy.   No fever, no chills now, no nausea, no vomiting.   No issues with kidney stones.   Right now reports feeling fatigue.   She has a family history of kidney disease.     Past Medical History:    Asthma (HCC)    Diabetes (HCC)    Essential hypertension    Gout    High blood pressure     History reviewed. No pertinent surgical history.  History reviewed. No pertinent family history.  Social History     Socioeconomic History    Marital status:    Tobacco Use    Smoking status: Never    Smokeless tobacco: Never   Substance and Sexual Activity    Alcohol use: Not Currently    Drug use: Not Currently     Social Determinants of Health     Food Insecurity: No Food Insecurity (7/5/2024)    Food Insecurity     Food Insecurity: Never true   Transportation Needs: No Transportation Needs (7/5/2024)    Transportation Needs     Lack of Transportation: No   Housing Stability: Low Risk  (7/5/2024)    Housing Stability     Housing Instability: No     Current Outpatient Medications   Medication Sig Dispense Refill    atorvastatin 20 MG Oral Tab       albuterol 108 (90 Base) MCG/ACT Inhalation Aero Soln Inhale 1 puff into the lungs every 4  (four) hours as needed for Shortness of Breath or Wheezing.      lisinopril 10 MG Oral Tab Take 1 tablet (10 mg total) by mouth daily.      allopurinol 100 MG Oral Tab Take 1 tablet (100 mg total) by mouth daily.      metFORMIN HCl 1000 MG Oral Tab Take 0.5 tablets (500 mg total) by mouth 2 (two) times daily with meals.         Allergies: Patient has no known allergies.    REVIEW OF SYSTEMS:  A comprehensive 10 point review of systems was completed.  Pertinent positives and negatives noted in the the HPI.       EXAM:  /85 (BP Location: Left arm, Patient Position: Sitting, Cuff Size: large)   Pulse 84   Ht 5' 2\" (1.575 m)   Wt 260 lb (117.9 kg)   BMI 47.55 kg/m²     Physical Exam  Constitutional:       Appearance: Normal appearance.   HENT:      Head: Normocephalic and atraumatic.   Eyes:      General: No scleral icterus.     Conjunctiva/sclera: Conjunctivae normal.   Pulmonary:      Effort: Pulmonary effort is normal.   Abdominal:      General: There is no distension.      Tenderness: There is no abdominal tenderness. There is no right CVA tenderness or left CVA tenderness.   Musculoskeletal:         General: Normal range of motion.   Skin:     General: Skin is warm and dry.   Neurological:      Mental Status: She is alert and oriented to person, place, and time.   Psychiatric:         Mood and Affect: Mood normal.         Behavior: Behavior normal.          LABS:  No results found for: \"PSA\", \"QPSA\", \"TOTPSASCREEN\"  Lab Results   Component Value Date    WBC 4.8 07/08/2024    RBC 4.50 07/08/2024    HGB 10.7 (L) 07/08/2024    HCT 33.6 (L) 07/08/2024    MCV 74.7 (L) 07/08/2024    MCH 23.8 (L) 07/08/2024    MCHC 31.8 07/08/2024    RDW 14.9 07/08/2024    .0 07/08/2024     Lab Results   Component Value Date     (H) 07/08/2024    BUN 14 07/08/2024    BUNCREA 20.3 (H) 07/08/2024    CREATSERUM 0.69 07/08/2024    ANIONGAP 7 07/08/2024    GFRNAA 62 10/30/2021    GFRAA 71 10/30/2021    CA 8.8 07/08/2024      07/08/2024    K 4.0 07/08/2024     07/08/2024    CO2 27.0 07/08/2024     No results found for: \"PTP\", \"PT\", \"INR\"    IMAGING:  No results found.    IMPRESSION:  Pleasant 58-year-old female comes in for new patient visit.  She reports that for the last 2 months she has had recurrent urinary tract infections they have seen blood in the urine during that as well.  She also has concerns for kidney disease, she reports family history of kidney disease in her mother and her father.  The patient's BMP from July 8, 2024 shows normal serum creatinine.  The patient's urine culture from July 4, 2024 shows positive urine culture for E. coli.  She reports other urinalyses and cultures were done at urgent cares.  I had a lengthy discussion with Juan Manuel Romero regarding the diagnosis and definition of asymptomatic microscopic hematuria.  We went over the relevant anatomy as well as the different possible etiologies and differential diagnoses including benign causes such as infections, stones, recent instrumentation of the genitourinary trac. We also discussed more serious potential causes including malignancy or tumors in the upper or lower urinary tracts.    I then discussed the proposed workup for microscopic hematuria.  This includes a voided urine sample for cytology, a CT-Urogram to evaluate the upper urinary tracts, as well as a cystoscopy to evaluate the bladder and urethra.    Further management and recommendations will be based on the results of the workup as outlined above.  Discussed that microscopic hematuria can occur with bacterial infection.  I advised her that we will repeat a urinalysis as well as urine PCR testing.  If testing shows that she does have chronic urinary tract infection/recurrent UTI we will treat appropriately will wait for UTI to clear, repeat a urinalysis if that continues to show microscopic hematuria we will discuss the workup above again they asked appropriate questions all of  which were answered to their satisfaction.     PLAN:  1) UA and Urine PCR.   2) Discussed following up with her PCP regarding symptoms of chronic fatigue, may need TSH, Vit D,CBC checked.   3) Referral to nephrologist placed.   Beverley Chance PA-C  9/16/2024 2:10 PM

## 2024-09-16 NOTE — TELEPHONE ENCOUNTER
Beverley RIVERS ordered vikor testing, order form, insurance card, notes printed and placed with labeled specimen at the  placed in correct vikor container

## 2024-09-18 ENCOUNTER — TELEPHONE (OUTPATIENT)
Dept: SURGERY | Facility: CLINIC | Age: 59
End: 2024-09-18

## 2024-09-18 DIAGNOSIS — N39.0 CHRONIC UTI: Primary | ICD-10-CM

## 2024-09-18 RX ORDER — CEFDINIR 300 MG/1
300 CAPSULE ORAL EVERY 12 HOURS
Qty: 20 CAPSULE | Refills: 0 | Status: SHIPPED | OUTPATIENT
Start: 2024-09-18 | End: 2024-09-28

## 2024-09-18 NOTE — TELEPHONE ENCOUNTER
I called and spoke with the patient.  She reports feeling fatigued.  Notified her of the urine PCR results, E. coli 1 x 10 t^4 copies/ uL.  We discussed antibiotic regimens.  Will start patient on cefdinir 300 mg twice daily for 10 days.  I advised her to follow-up with her PCP as we mentioned at the last visit to evaluate other causes for fatigue.  Patient verbalized understanding.  All questions answered.  Beverley Chance PA-C  September 18, 2024

## 2024-10-03 ENCOUNTER — TELEPHONE (OUTPATIENT)
Dept: SURGERY | Facility: CLINIC | Age: 59
End: 2024-10-03

## 2024-10-03 NOTE — TELEPHONE ENCOUNTER
FYI staff I sent a message to Beverley RIVERS, please make sure patient is aware if urinalysis needs to be done still.

## 2024-10-03 NOTE — TELEPHONE ENCOUNTER
Patient daughter requesting order for an bladder ultrasound .Please advise       Daughter Tiana [phone]

## 2024-10-03 NOTE — TELEPHONE ENCOUNTER
Called patient's daughter, verified name and  of her mother Juan Manuel. I let her know Beverley RIVERS ordered a referral for nephrologist and not an ultrasound.   Patient's daughter agreed, verbalized understandings and has no further questions.

## 2024-10-04 NOTE — TELEPHONE ENCOUNTER
Left message to call back.      Beverley Chance PA-C  Em Urology Clinical Staff40 minutes ago (2:08 PM)       I did not order an US. I ordered urine PCR. I think that was completed.

## (undated) NOTE — ED AVS SNAPSHOT
Blanca Camejo   MRN: [de-identified]    Department:  [facility] Emergency Department   Date of Visit:  8/16/2019           Disclosure     Insurance plans vary and the physician(s) referred by the ER may not be covered by your plan.  Please contact CARE PHYSICIAN AT ONCE OR RETURN IMMEDIATELY TO THE EMERGENCY DEPARTMENT. If you have been prescribed any medication(s), please fill your prescription right away and begin taking the medication(s) as directed.   If you believe that any of the medications